# Patient Record
Sex: MALE | Race: WHITE | Employment: OTHER | URBAN - METROPOLITAN AREA
[De-identification: names, ages, dates, MRNs, and addresses within clinical notes are randomized per-mention and may not be internally consistent; named-entity substitution may affect disease eponyms.]

---

## 2022-02-25 ENCOUNTER — HOSPITAL ENCOUNTER (OUTPATIENT)
Age: 47
Discharge: HOME | End: 2022-02-27
Payer: MEDICAID

## 2022-02-25 ENCOUNTER — HOSPITAL ENCOUNTER (OUTPATIENT)
Dept: ULTRASOUND IMAGING | Age: 47
Discharge: HOME | End: 2022-02-27
Payer: MEDICAID

## 2022-02-25 DIAGNOSIS — Z13.6 SCREENING FOR ABDOMINAL AORTIC ANEURYSM: ICD-10-CM

## 2022-02-25 PROCEDURE — 76775 US EXAM ABDO BACK WALL LIM: CPT

## 2025-01-17 ENCOUNTER — HOSPITAL ENCOUNTER (INPATIENT)
Age: 50
LOS: 5 days | Discharge: ANOTHER HOSPITAL | DRG: 304 | End: 2025-01-23
Attending: EMERGENCY MEDICINE | Admitting: FAMILY MEDICINE
Payer: COMMERCIAL

## 2025-01-17 DIAGNOSIS — I16.0 HYPERTENSIVE URGENCY: ICD-10-CM

## 2025-01-17 DIAGNOSIS — R07.9 CHEST PAIN, UNSPECIFIED TYPE: Primary | ICD-10-CM

## 2025-01-17 PROCEDURE — 80053 COMPREHEN METABOLIC PANEL: CPT

## 2025-01-17 PROCEDURE — 6360000002 HC RX W HCPCS: Performed by: EMERGENCY MEDICINE

## 2025-01-17 PROCEDURE — 99285 EMERGENCY DEPT VISIT HI MDM: CPT

## 2025-01-17 PROCEDURE — 96375 TX/PRO/DX INJ NEW DRUG ADDON: CPT

## 2025-01-17 PROCEDURE — 96374 THER/PROPH/DIAG INJ IV PUSH: CPT

## 2025-01-17 PROCEDURE — 85025 COMPLETE CBC W/AUTO DIFF WBC: CPT

## 2025-01-17 PROCEDURE — 93005 ELECTROCARDIOGRAM TRACING: CPT | Performed by: EMERGENCY MEDICINE

## 2025-01-17 PROCEDURE — 84484 ASSAY OF TROPONIN QUANT: CPT

## 2025-01-17 RX ORDER — HYDRALAZINE HYDROCHLORIDE 20 MG/ML
10 INJECTION INTRAMUSCULAR; INTRAVENOUS ONCE
Status: COMPLETED | OUTPATIENT
Start: 2025-01-18 | End: 2025-01-17

## 2025-01-17 RX ORDER — PROCHLORPERAZINE EDISYLATE 5 MG/ML
10 INJECTION INTRAMUSCULAR; INTRAVENOUS ONCE
Status: COMPLETED | OUTPATIENT
Start: 2025-01-18 | End: 2025-01-17

## 2025-01-17 RX ADMIN — PROCHLORPERAZINE EDISYLATE 10 MG: 5 INJECTION INTRAMUSCULAR; INTRAVENOUS at 23:59

## 2025-01-17 RX ADMIN — HYDRALAZINE HYDROCHLORIDE 10 MG: 20 INJECTION INTRAMUSCULAR; INTRAVENOUS at 23:59

## 2025-01-18 ENCOUNTER — APPOINTMENT (OUTPATIENT)
Dept: CT IMAGING | Age: 50
DRG: 304 | End: 2025-01-18
Payer: COMMERCIAL

## 2025-01-18 ENCOUNTER — APPOINTMENT (OUTPATIENT)
Age: 50
DRG: 304 | End: 2025-01-18
Attending: FAMILY MEDICINE
Payer: COMMERCIAL

## 2025-01-18 ENCOUNTER — APPOINTMENT (OUTPATIENT)
Dept: GENERAL RADIOLOGY | Age: 50
DRG: 304 | End: 2025-01-18
Payer: COMMERCIAL

## 2025-01-18 PROBLEM — R33.8 ACUTE URINARY RETENTION: Status: ACTIVE | Noted: 2025-01-18

## 2025-01-18 PROBLEM — E78.00 PURE HYPERCHOLESTEROLEMIA: Status: ACTIVE | Noted: 2025-01-18

## 2025-01-18 PROBLEM — E66.9 MODERATE OBESITY: Status: ACTIVE | Noted: 2025-01-18

## 2025-01-18 PROBLEM — I10 PRIMARY HYPERTENSION: Status: ACTIVE | Noted: 2025-01-18

## 2025-01-18 PROBLEM — E11.9 TYPE 2 DIABETES MELLITUS WITHOUT COMPLICATION, WITHOUT LONG-TERM CURRENT USE OF INSULIN (HCC): Status: ACTIVE | Noted: 2025-01-18

## 2025-01-18 PROBLEM — R07.9 CHEST PAIN: Status: ACTIVE | Noted: 2025-01-18

## 2025-01-18 PROBLEM — R07.89 ATYPICAL CHEST PAIN: Status: ACTIVE | Noted: 2025-01-18

## 2025-01-18 LAB
ALBUMIN SERPL-MCNC: 4.5 G/DL (ref 3.5–5.2)
ALP SERPL-CCNC: 100 U/L (ref 40–129)
ALT SERPL-CCNC: 10 U/L (ref 0–40)
AMPHET UR QL SCN: NEGATIVE
ANION GAP SERPL CALCULATED.3IONS-SCNC: 12 MMOL/L (ref 7–16)
APAP SERPL-MCNC: <5 UG/ML (ref 10–30)
AST SERPL-CCNC: 17 U/L (ref 0–39)
BARBITURATES UR QL SCN: NEGATIVE
BASOPHILS # BLD: 0.1 K/UL (ref 0–0.2)
BASOPHILS NFR BLD: 1 % (ref 0–2)
BENZODIAZ UR QL: NEGATIVE
BILIRUB SERPL-MCNC: 0.5 MG/DL (ref 0–1.2)
BNP SERPL-MCNC: 167 PG/ML (ref 0–125)
BUN SERPL-MCNC: 7 MG/DL (ref 6–20)
BUPRENORPHINE UR QL: NEGATIVE
CALCIUM SERPL-MCNC: 9.4 MG/DL (ref 8.6–10.2)
CANNABINOIDS UR QL SCN: NEGATIVE
CHLORIDE SERPL-SCNC: 100 MMOL/L (ref 98–107)
CHOLEST SERPL-MCNC: 175 MG/DL
CO2 SERPL-SCNC: 26 MMOL/L (ref 22–29)
COCAINE UR QL SCN: NEGATIVE
CREAT SERPL-MCNC: 0.7 MG/DL (ref 0.7–1.2)
ECHO AV CUSP MM: 1.7 CM
ECHO AV MEAN GRADIENT: 8 MMHG
ECHO AV MEAN VELOCITY: 1.4 M/S
ECHO AV PEAK GRADIENT: 14 MMHG
ECHO AV PEAK VELOCITY: 1.9 M/S
ECHO AV VTI: 23.6 CM
ECHO EST RA PRESSURE: 15 MMHG
ECHO LA DIAMETER: 3.9 CM
ECHO LA VOL A-L A2C: 30 ML (ref 18–58)
ECHO LA VOL A-L A4C: 69 ML (ref 18–58)
ECHO LA VOL BP: 48 ML (ref 18–58)
ECHO LA VOL MOD A2C: 29 ML (ref 18–58)
ECHO LA VOL MOD A4C: 66 ML (ref 18–58)
ECHO LA VOLUME AREA LENGTH: 50 ML
ECHO LV EF PHYSICIAN: 80 %
ECHO LV FRACTIONAL SHORTENING: 29 % (ref 28–44)
ECHO LV INTERNAL DIMENSION DIASTOLIC: 4.1 CM (ref 4.2–5.9)
ECHO LV INTERNAL DIMENSION SYSTOLIC: 2.9 CM
ECHO LV IVSD: 1.1 CM (ref 0.6–1)
ECHO LV IVSS: 1.5 CM
ECHO LV MASS 2D: 151.3 G (ref 88–224)
ECHO LV POSTERIOR WALL DIASTOLIC: 1.1 CM (ref 0.6–1)
ECHO LV POSTERIOR WALL SYSTOLIC: 1.3 CM
ECHO LV RELATIVE WALL THICKNESS RATIO: 0.54
ECHO LVOT AREA: 3.1 CM2
ECHO LVOT DIAM: 2 CM
ECHO MV E DECELERATION TIME (DT): 95.8 MS
ECHO MV MAX VELOCITY: 1.9 M/S
ECHO MV MEAN GRADIENT: 8 MMHG
ECHO MV MEAN VELOCITY: 1.3 M/S
ECHO MV PEAK GRADIENT: 14 MMHG
ECHO MV VTI: 21.5 CM
ECHO PV MAX VELOCITY: 2.1 M/S
ECHO PV MEAN GRADIENT: 9 MMHG
ECHO PV MEAN VELOCITY: 1.4 M/S
ECHO PV PEAK GRADIENT: 18 MMHG
ECHO PV VTI: 25 CM
ECHO RIGHT VENTRICULAR SYSTOLIC PRESSURE (RVSP): 59 MMHG
ECHO RV INTERNAL DIMENSION: 2.9 CM
ECHO TV REGURGITANT MAX VELOCITY: 3.33 M/S
ECHO TV REGURGITANT PEAK GRADIENT: 44 MMHG
EOSINOPHIL # BLD: 0.1 K/UL (ref 0.05–0.5)
EOSINOPHILS RELATIVE PERCENT: 1 % (ref 0–6)
ERYTHROCYTE [DISTWIDTH] IN BLOOD BY AUTOMATED COUNT: 13 % (ref 11.5–15)
ETHANOLAMINE SERPL-MCNC: <10 MG/DL (ref 0–0.08)
FENTANYL UR QL: NEGATIVE
GFR, ESTIMATED: >90 ML/MIN/1.73M2
GLUCOSE SERPL-MCNC: 166 MG/DL (ref 74–99)
HBA1C MFR BLD: 5.9 % (ref 4–5.6)
HCT VFR BLD AUTO: 42.9 % (ref 37–54)
HDLC SERPL-MCNC: 54 MG/DL
HGB BLD-MCNC: 14.6 G/DL (ref 12.5–16.5)
IMM GRANULOCYTES # BLD AUTO: 0.05 K/UL (ref 0–0.58)
IMM GRANULOCYTES NFR BLD: 1 % (ref 0–5)
LDLC SERPL CALC-MCNC: 110 MG/DL
LYMPHOCYTES NFR BLD: 1.66 K/UL (ref 1.5–4)
LYMPHOCYTES RELATIVE PERCENT: 15 % (ref 20–42)
MCH RBC QN AUTO: 29 PG (ref 26–35)
MCHC RBC AUTO-ENTMCNC: 34 G/DL (ref 32–34.5)
MCV RBC AUTO: 85.1 FL (ref 80–99.9)
METHADONE UR QL: NEGATIVE
MONOCYTES NFR BLD: 0.51 K/UL (ref 0.1–0.95)
MONOCYTES NFR BLD: 5 % (ref 2–12)
NEUTROPHILS NFR BLD: 78 % (ref 43–80)
NEUTS SEG NFR BLD: 8.38 K/UL (ref 1.8–7.3)
OPIATES UR QL SCN: NEGATIVE
OXYCODONE UR QL SCN: NEGATIVE
PCP UR QL SCN: NEGATIVE
PLATELET # BLD AUTO: 285 K/UL (ref 130–450)
PMV BLD AUTO: 10.3 FL (ref 7–12)
POTASSIUM SERPL-SCNC: 4 MMOL/L (ref 3.5–5)
PROT SERPL-MCNC: 7.8 G/DL (ref 6.4–8.3)
RBC # BLD AUTO: 5.04 M/UL (ref 3.8–5.8)
SALICYLATES SERPL-MCNC: <0.3 MG/DL (ref 0–30)
SODIUM SERPL-SCNC: 138 MMOL/L (ref 132–146)
TEST INFORMATION: NORMAL
TOXIC TRICYCLIC SC,BLOOD: NEGATIVE
TRIGL SERPL-MCNC: 56 MG/DL
TROPONIN I SERPL HS-MCNC: 13 NG/L (ref 0–11)
TROPONIN I SERPL HS-MCNC: 18 NG/L (ref 0–11)
TROPONIN I SERPL HS-MCNC: 9 NG/L (ref 0–11)
TSH SERPL DL<=0.05 MIU/L-ACNC: 1.81 UIU/ML (ref 0.27–4.2)
VLDLC SERPL CALC-MCNC: 11 MG/DL
WBC OTHER # BLD: 10.8 K/UL (ref 4.5–11.5)

## 2025-01-18 PROCEDURE — 83880 ASSAY OF NATRIURETIC PEPTIDE: CPT

## 2025-01-18 PROCEDURE — 71045 X-RAY EXAM CHEST 1 VIEW: CPT

## 2025-01-18 PROCEDURE — 99223 1ST HOSP IP/OBS HIGH 75: CPT | Performed by: INTERNAL MEDICINE

## 2025-01-18 PROCEDURE — 99222 1ST HOSP IP/OBS MODERATE 55: CPT | Performed by: FAMILY MEDICINE

## 2025-01-18 PROCEDURE — 96375 TX/PRO/DX INJ NEW DRUG ADDON: CPT

## 2025-01-18 PROCEDURE — 93306 TTE W/DOPPLER COMPLETE: CPT

## 2025-01-18 PROCEDURE — 83036 HEMOGLOBIN GLYCOSYLATED A1C: CPT

## 2025-01-18 PROCEDURE — 36415 COLL VENOUS BLD VENIPUNCTURE: CPT

## 2025-01-18 PROCEDURE — 80179 DRUG ASSAY SALICYLATE: CPT

## 2025-01-18 PROCEDURE — 6370000000 HC RX 637 (ALT 250 FOR IP)

## 2025-01-18 PROCEDURE — 2500000003 HC RX 250 WO HCPCS: Performed by: FAMILY MEDICINE

## 2025-01-18 PROCEDURE — APPSS60 APP SPLIT SHARED TIME 46-60 MINUTES

## 2025-01-18 PROCEDURE — G0480 DRUG TEST DEF 1-7 CLASSES: HCPCS

## 2025-01-18 PROCEDURE — 6360000002 HC RX W HCPCS

## 2025-01-18 PROCEDURE — 6370000000 HC RX 637 (ALT 250 FOR IP): Performed by: FAMILY MEDICINE

## 2025-01-18 PROCEDURE — 70450 CT HEAD/BRAIN W/O DYE: CPT

## 2025-01-18 PROCEDURE — 84484 ASSAY OF TROPONIN QUANT: CPT

## 2025-01-18 PROCEDURE — 93306 TTE W/DOPPLER COMPLETE: CPT | Performed by: INTERNAL MEDICINE

## 2025-01-18 PROCEDURE — 80307 DRUG TEST PRSMV CHEM ANLYZR: CPT

## 2025-01-18 PROCEDURE — 6360000002 HC RX W HCPCS: Performed by: FAMILY MEDICINE

## 2025-01-18 PROCEDURE — 6360000004 HC RX CONTRAST MEDICATION: Performed by: RADIOLOGY

## 2025-01-18 PROCEDURE — 71275 CT ANGIOGRAPHY CHEST: CPT

## 2025-01-18 PROCEDURE — 84443 ASSAY THYROID STIM HORMONE: CPT

## 2025-01-18 PROCEDURE — 6360000002 HC RX W HCPCS: Performed by: EMERGENCY MEDICINE

## 2025-01-18 PROCEDURE — 80143 DRUG ASSAY ACETAMINOPHEN: CPT

## 2025-01-18 PROCEDURE — 80061 LIPID PANEL: CPT

## 2025-01-18 PROCEDURE — 51702 INSERT TEMP BLADDER CATH: CPT

## 2025-01-18 PROCEDURE — 93005 ELECTROCARDIOGRAM TRACING: CPT

## 2025-01-18 PROCEDURE — 2060000000 HC ICU INTERMEDIATE R&B

## 2025-01-18 RX ORDER — ACETAMINOPHEN 325 MG/1
650 TABLET ORAL EVERY 6 HOURS PRN
Status: DISCONTINUED | OUTPATIENT
Start: 2025-01-18 | End: 2025-01-23 | Stop reason: HOSPADM

## 2025-01-18 RX ORDER — ONDANSETRON 2 MG/ML
4 INJECTION INTRAMUSCULAR; INTRAVENOUS ONCE
Status: COMPLETED | OUTPATIENT
Start: 2025-01-18 | End: 2025-01-18

## 2025-01-18 RX ORDER — ENOXAPARIN SODIUM 100 MG/ML
40 INJECTION SUBCUTANEOUS DAILY
Status: DISCONTINUED | OUTPATIENT
Start: 2025-01-18 | End: 2025-01-21

## 2025-01-18 RX ORDER — ASPIRIN 325 MG
325 TABLET ORAL
Status: ACTIVE | OUTPATIENT
Start: 2025-01-18 | End: 2025-01-18

## 2025-01-18 RX ORDER — ONDANSETRON 2 MG/ML
4 INJECTION INTRAMUSCULAR; INTRAVENOUS EVERY 6 HOURS PRN
Status: DISCONTINUED | OUTPATIENT
Start: 2025-01-18 | End: 2025-01-23 | Stop reason: HOSPADM

## 2025-01-18 RX ORDER — ONDANSETRON 4 MG/1
4 TABLET, ORALLY DISINTEGRATING ORAL EVERY 8 HOURS PRN
Status: DISCONTINUED | OUTPATIENT
Start: 2025-01-18 | End: 2025-01-23 | Stop reason: HOSPADM

## 2025-01-18 RX ORDER — PROCHLORPERAZINE EDISYLATE 5 MG/ML
10 INJECTION INTRAMUSCULAR; INTRAVENOUS ONCE
Status: COMPLETED | OUTPATIENT
Start: 2025-01-18 | End: 2025-01-18

## 2025-01-18 RX ORDER — IOPAMIDOL 755 MG/ML
75 INJECTION, SOLUTION INTRAVASCULAR
Status: COMPLETED | OUTPATIENT
Start: 2025-01-18 | End: 2025-01-18

## 2025-01-18 RX ORDER — POLYETHYLENE GLYCOL 3350 17 G/17G
17 POWDER, FOR SOLUTION ORAL DAILY PRN
Status: DISCONTINUED | OUTPATIENT
Start: 2025-01-18 | End: 2025-01-23 | Stop reason: HOSPADM

## 2025-01-18 RX ORDER — SODIUM CHLORIDE 0.9 % (FLUSH) 0.9 %
5-40 SYRINGE (ML) INJECTION EVERY 12 HOURS SCHEDULED
Status: DISCONTINUED | OUTPATIENT
Start: 2025-01-18 | End: 2025-01-23 | Stop reason: HOSPADM

## 2025-01-18 RX ORDER — CARVEDILOL 25 MG/1
25 TABLET ORAL 2 TIMES DAILY WITH MEALS
Status: DISCONTINUED | OUTPATIENT
Start: 2025-01-18 | End: 2025-01-23 | Stop reason: HOSPADM

## 2025-01-18 RX ORDER — SODIUM CHLORIDE 0.9 % (FLUSH) 0.9 %
5-40 SYRINGE (ML) INJECTION PRN
Status: DISCONTINUED | OUTPATIENT
Start: 2025-01-18 | End: 2025-01-23 | Stop reason: HOSPADM

## 2025-01-18 RX ORDER — POTASSIUM CHLORIDE 1500 MG/1
40 TABLET, EXTENDED RELEASE ORAL PRN
Status: DISCONTINUED | OUTPATIENT
Start: 2025-01-18 | End: 2025-01-23 | Stop reason: HOSPADM

## 2025-01-18 RX ORDER — ACETAMINOPHEN 650 MG/1
650 SUPPOSITORY RECTAL EVERY 6 HOURS PRN
Status: DISCONTINUED | OUTPATIENT
Start: 2025-01-18 | End: 2025-01-23 | Stop reason: HOSPADM

## 2025-01-18 RX ORDER — AMLODIPINE BESYLATE 10 MG/1
10 TABLET ORAL DAILY
Status: DISCONTINUED | OUTPATIENT
Start: 2025-01-18 | End: 2025-01-23 | Stop reason: HOSPADM

## 2025-01-18 RX ORDER — POTASSIUM CHLORIDE 7.45 MG/ML
10 INJECTION INTRAVENOUS PRN
Status: DISCONTINUED | OUTPATIENT
Start: 2025-01-18 | End: 2025-01-23 | Stop reason: HOSPADM

## 2025-01-18 RX ORDER — LABETALOL HYDROCHLORIDE 5 MG/ML
10 INJECTION, SOLUTION INTRAVENOUS ONCE
Status: COMPLETED | OUTPATIENT
Start: 2025-01-18 | End: 2025-01-18

## 2025-01-18 RX ORDER — NITROGLYCERIN 20 MG/100ML
5-200 INJECTION INTRAVENOUS CONTINUOUS
Status: DISCONTINUED | OUTPATIENT
Start: 2025-01-18 | End: 2025-01-19

## 2025-01-18 RX ORDER — ONDANSETRON 2 MG/ML
INJECTION INTRAMUSCULAR; INTRAVENOUS
Status: COMPLETED
Start: 2025-01-18 | End: 2025-01-18

## 2025-01-18 RX ORDER — TAMSULOSIN HYDROCHLORIDE 0.4 MG/1
0.4 CAPSULE ORAL DAILY
Status: DISCONTINUED | OUTPATIENT
Start: 2025-01-18 | End: 2025-01-23 | Stop reason: HOSPADM

## 2025-01-18 RX ORDER — CALCIUM CARBONATE 500 MG/1
500 TABLET, CHEWABLE ORAL 3 TIMES DAILY PRN
Status: DISCONTINUED | OUTPATIENT
Start: 2025-01-18 | End: 2025-01-23 | Stop reason: HOSPADM

## 2025-01-18 RX ORDER — LABETALOL HYDROCHLORIDE 5 MG/ML
INJECTION, SOLUTION INTRAVENOUS
Status: COMPLETED
Start: 2025-01-18 | End: 2025-01-18

## 2025-01-18 RX ORDER — SODIUM CHLORIDE 9 MG/ML
INJECTION, SOLUTION INTRAVENOUS PRN
Status: DISCONTINUED | OUTPATIENT
Start: 2025-01-18 | End: 2025-01-23 | Stop reason: HOSPADM

## 2025-01-18 RX ORDER — HYDRALAZINE HYDROCHLORIDE 20 MG/ML
10 INJECTION INTRAMUSCULAR; INTRAVENOUS EVERY 6 HOURS PRN
Status: DISCONTINUED | OUTPATIENT
Start: 2025-01-18 | End: 2025-01-23 | Stop reason: HOSPADM

## 2025-01-18 RX ORDER — MAGNESIUM SULFATE IN WATER 40 MG/ML
2000 INJECTION, SOLUTION INTRAVENOUS PRN
Status: DISCONTINUED | OUTPATIENT
Start: 2025-01-18 | End: 2025-01-23 | Stop reason: HOSPADM

## 2025-01-18 RX ADMIN — TAMSULOSIN HYDROCHLORIDE 0.4 MG: 0.4 CAPSULE ORAL at 12:49

## 2025-01-18 RX ADMIN — HYDRALAZINE HYDROCHLORIDE 10 MG: 20 INJECTION INTRAMUSCULAR; INTRAVENOUS at 08:02

## 2025-01-18 RX ADMIN — ONDANSETRON 4 MG: 2 INJECTION INTRAMUSCULAR; INTRAVENOUS at 00:57

## 2025-01-18 RX ADMIN — CALCIUM CARBONATE 500 MG: 500 TABLET, CHEWABLE ORAL at 20:28

## 2025-01-18 RX ADMIN — ONDANSETRON 4 MG: 2 INJECTION INTRAMUSCULAR; INTRAVENOUS at 08:03

## 2025-01-18 RX ADMIN — LABETALOL HYDROCHLORIDE 10 MG: 5 INJECTION INTRAVENOUS at 01:44

## 2025-01-18 RX ADMIN — PROCHLORPERAZINE EDISYLATE 10 MG: 5 INJECTION INTRAMUSCULAR; INTRAVENOUS at 09:22

## 2025-01-18 RX ADMIN — LABETALOL HYDROCHLORIDE 10 MG: 5 INJECTION INTRAVENOUS at 03:34

## 2025-01-18 RX ADMIN — LABETALOL HYDROCHLORIDE 10 MG: 5 INJECTION, SOLUTION INTRAVENOUS at 01:44

## 2025-01-18 RX ADMIN — ACETAMINOPHEN 650 MG: 325 TABLET ORAL at 12:49

## 2025-01-18 RX ADMIN — ENOXAPARIN SODIUM 40 MG: 100 INJECTION SUBCUTANEOUS at 12:49

## 2025-01-18 RX ADMIN — ONDANSETRON HYDROCHLORIDE 4 MG: 2 INJECTION, SOLUTION INTRAMUSCULAR; INTRAVENOUS at 00:57

## 2025-01-18 RX ADMIN — CARVEDILOL 25 MG: 25 TABLET, FILM COATED ORAL at 12:49

## 2025-01-18 RX ADMIN — SODIUM CHLORIDE, PRESERVATIVE FREE 10 ML: 5 INJECTION INTRAVENOUS at 19:51

## 2025-01-18 RX ADMIN — AMLODIPINE BESYLATE 10 MG: 10 TABLET ORAL at 12:49

## 2025-01-18 RX ADMIN — NITROGLYCERIN 5 MCG/MIN: 20 INJECTION INTRAVENOUS at 03:58

## 2025-01-18 RX ADMIN — IOPAMIDOL 75 ML: 755 INJECTION, SOLUTION INTRAVENOUS at 00:53

## 2025-01-18 ASSESSMENT — PAIN DESCRIPTION - LOCATION: LOCATION: HEAD

## 2025-01-18 ASSESSMENT — PAIN SCALES - GENERAL
PAINLEVEL_OUTOF10: 1
PAINLEVEL_OUTOF10: 3
PAINLEVEL_OUTOF10: 0

## 2025-01-18 ASSESSMENT — PAIN DESCRIPTION - DESCRIPTORS: DESCRIPTORS: ACHING;POUNDING;THROBBING

## 2025-01-18 NOTE — PROGRESS NOTES
The patient's echocardiogram was reviewed and demonstrates borderline concentric left ventricular hypertrophy with hyperdynamic left ventricular systolic function and no evidence of regional wall motion abnormalities.  Additional high-sensitivity troponin levels are gradually elevated compared to that of baseline and based on symptomatology, albeit atypical and the patient's risk profile, on a prognostic basis, a gated vasodilator myocardial perfusion imaging study will be advisable with continued needs of medical management independent of findings to aggressively treat blood pressure, diabetes and serum lipids in attempt to reduce risk of future atherosclerotic development

## 2025-01-18 NOTE — CONSULTS
Inpatient Cardiology Consultation      Reason for Consult: Chest pain    Consulting Physician: Dr Sanchez    Requesting Physician:  Robi Marte MD    Date of Consultation: 1/18/2025    HISTORY OF PRESENT ILLNESS:   Patient is a 49-year-old male who is previously unknown to [facility] cardiology.    HPI:  Patient presented to ER 1/17/2025 for chest pain beginning 1 hour prior to arrival.  Reportedly worsened by nothing.  Reporting pain rating to arm/neck with dizziness and lightheadedness and nausea and vomiting.  Patient was given nitro and aspirin PTA by EMS.  EKG with no ST changes.  Patient is significant hypertensive and given several rounds of IV antihypertensives hydralazine and labetalol.  Patient with urinary retention in ER unable to void and had to be straight cathed for 1 L urine.  Urology has been consulted.  Patient was started on nitroglycerin drip, Coreg and Norvasc by primary service.  Echo and stress both ordered by primary service as well.  VS upon arrival 98.5, 12 respirations, 93 pulse, 196/118, 94% room air.  Labs: Potassium 4.0, BUN 7, creatinine 0.7, glucose 166, calcium 9.4, troponin 9, albumin 4.5, WBC 10.8, H&H 14.6/42.9, platelet 285  CT head: No acute process  CTA chest: No PE pleural effusion or infiltrate  CXR: No acute process.    Upon assessment today 1/18/2025 patient is lying Semi-Martines in hospital bed on room air with 2 corrections officers at bedside and left arm shackled to bed.  Patient currently incarcerated in longterm since March 2024.  Patient tells me yesterday while lying in his bunk he developed acute onset chest pain described as stabbing in his left chest with radiation to his left arm 10 out of 10.  He reports after the chest pain started he had vomiting and diaphoresis.  He reports this persisted until coming into the ER.  He denies any recent illness or having any new symptoms prior to this chest pain.  He is unsure what medications he is given at the longterm.  you have any questions or concerns.    Jesse Sanchez MD  [facility] Cardiology

## 2025-01-18 NOTE — PROGRESS NOTES
Hospitalist Progress Note      Synopsis:   49 y.o. male who presented to [facility] with chest pain and was found to have significant elevated blood pressure of 233/132.  Patient received couple of IV labetalol and hydralazine.  Initial plan was to start him on nicardipine drip but blood pressure improved to 166/122.  Patient was complaining of chest pain mid chest region on his left side radiating to his left arm.  He does mention to have a history of high blood pressure and takes 2 medications but does not recall what medications he is given in jail.  CT angiogram of the chest was negative for PE.  Troponin of 9.  Patient had urinary retention and required straight cath x 2 in the ER.  Bladder scan with about 400 cc post straight cath x2.  Given persistent chest pain he was started on nitroglycerin drip.     Hospital day 0     Subjective:  Stable overnight.  No issues reported.   Patient seen and examined  Records reviewed.       Temp (24hrs), Av.4 °F (36.9 °C), Min:98.3 °F (36.8 °C), Max:98.5 °F (36.9 °C)    DIET: Diet NPO Exceptions are: Sips of Water with Meds  CODE: Full Code  No intake or output data in the 24 hours ending 25 0850    Review of Systems:    All bolded are positive; please see HPI  General:  Fever, chills, diaphoresis, fatigue, malaise, night sweats, weight loss  Psychological:  Anxiety, disorientation, hallucinations.  ENT:  Epistaxis, headaches, vertigo, visual changes.  Cardiovascular:  Chest pain, irregular heartbeats, palpitations, paroxysmal nocturnal dyspnea.  Respiratory:  Shortness of breath, coughing, sputum production, hemoptysis, wheezing, orthopnea.  Gastrointestinal:  Nausea, vomiting, diarrhea, heartburn, constipation, abdominal pain, hematemesis, hematochezia, melena, acholic stools  Genito-Urinary:  Dysuria, urgency, frequency, hematuria  Musculoskeletal:  Joint pain, joint stiffness, joint swelling, muscle pain  Neurology:  Headache, focal  hours.    No results for input(s): \"CKTOTAL\", \"TROPONINI\" in the last 72 hours.    Chronic labs:    No results found for: \"CHOL\", \"TRIG\", \"HDL\", \"TSH\", \"PSA\", \"INR\", \"LABA1C\"    Radiology: REVIEWED DAILY    Assessment:    Plan:    Chest pain possibly secondary to hypertensive urgency vs CAD  Cardiology consulted, believe chest pain is secondary to suboptimal controlled hypertension.   echocardiogram was reviewed and demonstrates borderline concentric left ventricular hypertrophy with hyperdynamic left ventricular systolic function and no evidence of regional wall motion abnormalities   Troponin 9 > 13 > 18  Stress test ordered  Blood pressure improving will continue Coreg, Norvasc  Continue nitro drip for chest pain  Lipid panel  TSH  Hemoglobin A1c      DVT Prophylaxis [x] Lovenox  []  Heparin [] DOAC [] PCDs [] Ambulation    GI Prophylaxis [] PPI  [] H2 Blocker   [] Carafate  [x] Diet/Tube Feeds   Level of care [] Med/Surg  [x] Intermediate  []  ICU   Diet Diet NPO Exceptions are: Sips of Water with Meds    Family contact []  N/A    [] At bedside  [] Phone call   Disposition Patient requires continued admission    MDM [] Low    [x] Moderate  []   High       Discharge Plan: Currently awaiting stress test and echocardiogram results.     +++++++++++++++++++++++++++++++++++++++++++++++++  JOSEPH Dobson Physician - Hospitalist  [place], [place]  +++++++++++++++++++++++++++++++++++++++++++++++++  NOTE: This report was transcribed using voice recognition software. Every effort was made to ensure accuracy; however, inadvertent computerized transcription errors may be present.

## 2025-01-18 NOTE — ED NOTES
Patient again attempting to void, unable to. Straight cath'd again and drained 600cc urine.  Tolerated well.

## 2025-01-18 NOTE — ED PROVIDER NOTES
HPI:  1/17/25, Time: 11:38 PM EST         Tremayne Han is a 49 y.o. male history of hypertension history of hyperlipidemia presenting to the ED for chest pain, beginning 1 hour ago.  The complaint has been intermittent, moderate in severity, and worsened by nothing.  Patient presenting here because of having chest pain.  Patient reporting pain going to his arm.  Patient reporting dizzy and lightheadedness.  Patient reporting nausea vomiting.  Patient reporting no headache he reports some mild left-sided neck pain.  Patient reporting no calf pain no swelling reports no upper or lower extremity weakness he reports no photophobia.  Patient reporting no fever or productive cough.  Patient was given nitro and aspirin by EMS.    ROS:   Pertinent positives and negatives are stated within HPI, all other systems reviewed and are negative.  --------------------------------------------- PAST HISTORY ---------------------------------------------  Past Medical History:  has no past medical history on file.    Past Surgical History:  has no past surgical history on file.    Social History:  reports that he does not currently use alcohol.    Family History: family history is not on file.     The patient’s home medications have been reviewed.    Allergies: Patient has no known allergies.    ---------------------------------------------------PHYSICAL EXAM--------------------------------------    Constitutional/General: Alert and oriented x3, mild distress  Head: Normocephalic and atraumatic  Eyes: PERRL, EOMI  Mouth: Oropharynx clear, handling secretions, no trismus  Neck: Supple, full ROM, non tender to palpation in the midline, no stridor, no crepitus, no meningeal signs  Pulmonary: Lungs clear to auscultation bilaterally, no wheezes, rales, or rhonchi. Not in respiratory distress  Cardiovascular:  Regular rate. Regular rhythm. No murmurs, gallops, or rubs. 2+ distal pulses  Chest: no chest wall tenderness  Abdomen: Soft.  Non

## 2025-01-18 NOTE — CONSULTS
1/18/2025 10:10 AM  Treamyne Han  [id number]     Chief Complaint:    Urinary retention    History of Present Illness:      The patient is a 49 y.o. male patient who presented to the hospital with complaints of chest pain.  He is a prisoner.  He required straight cath x 2 in the ER and had a Jacobo catheter placed on the floor.  He currently seems lethargic.  Cardiology is following for chest pain rule out.  He does not appear to have any prior urologic history per chart review.      History reviewed. No pertinent past medical history.      History reviewed. No pertinent surgical history.    Medications Prior to Admission:    No medications prior to admission.    Allergies:    Patient has no known allergies.    Social History:    reports that he does not currently use alcohol.    Family History:   Non-contributory to this Urological problem  family history is not on file.    Review of Systems:  A 12 point ROS was reviewed, with pertinent positives and negatives reviewed as above in HPI.    Physical Exam:     Vitals:  BP (!) 172/103   Pulse (!) 110   Temp 98.3 °F (36.8 °C) (Temporal)   Resp 18   Ht 1.829 m (6')   SpO2 94%     General: Awake but not alert or oriented.  No acute distress  HEENT:  Normocephalic, atraumatic.  Lungs:  Respirations symmetric and non-labored.  Abdomen:  soft, nontender, nondistended  Extremities:  No clubbing, cyanosis, or edema  Skin:  Warm and dry, no open lesions or rashes  Neuro: Moving all extremities spontaneously  Genitourinary: Jacobo in place draining clear yellow urine    Labs:     Recent Labs     01/17/25  2355   WBC 10.8   RBC 5.04   HGB 14.6   HCT 42.9   MCV 85.1   MCH 29.0   MCHC 34.0   RDW 13.0      MPV 10.3         Recent Labs     01/17/25  2355   CREATININE 0.7       No results found for: \"PSA\"    Imaging:   No relevant urologic imaging for review at this time    Assessment/plan:  Urinary retention    Discussed with patient urinary tension is likely  multifactorial related to hospitalization, immobility, pain.  Reasonable to attempt trial of void at hospital discharge after he undergoes his cardiology workup.  Urology will follow along      Electronically signed by Master Davis MD on 1/18/2025 at 10:10 AM  [facility] Urology

## 2025-01-18 NOTE — ED NOTES
Radiology Procedure Waiver   Name: Tremayne Han  : 1975  MRN: [id number]    Date:  25    Time: 11:46 PM EST    Benefits of immediately proceeding with Radiology exam(s) without pre-testing outweigh the risks or are not indicated as specified below and therefore the following is/are being waived:    [] Pregnancy test   [] Patients LMP on-time and regular.   [] Patient had Tubal Ligation or has other Contraception Device.   [] Patient  is Menopausal or Premenarcheal.    [] Patient had Full or Partial Hysterectomy.    [] Protocol for Iodine allergy    [] MRI Questionnaire     [x] BUN/Creatinine   [] Patient age w/no hx of renal dysfunction.   [] Patient on Dialysis.   [] Recent Normal Labs.  Electronically signed by Adam Spicer MD on 25 at 11:46 PM Adam Arevalo MD  [phone]

## 2025-01-18 NOTE — PROGRESS NOTES
4 Eyes Skin Assessment     NAME:  Tremayne Han  YOB: 1975  MEDICAL RECORD NUMBER:  [id number]    The patient is being assessed for  Admission    I agree that at least one RN has performed a thorough Head to Toe Skin Assessment on the patient. ALL assessment sites listed below have been assessed.      Areas assessed by both nurses:    Head, Face, Ears, Shoulders, Back, Chest, Arms, Elbows, Hands, Sacrum. Buttock, Coccyx, Ischium, Legs. Feet and Heels, and Under Medical Devices         Does the Patient have a Wound? No noted wound(s)       Markos Prevention initiated by RN: No  Wound Care Orders initiated by RN: No    Pressure Injury (Stage 3,4, Unstageable, DTI, NWPT, and Complex wounds) if present, place Wound referral order by RN under : No    New Ostomies, if present place, Ostomy referral order under : No     Nurse 1 eSignature: Electronically signed by Dorothea Mcfadden RN on 1/18/25 at 6:35 PM EST    **SHARE this note so that the co-signing nurse can place an eSignature**    Nurse 2 eSignature: Electronically signed by Renetta Ryan RN on 1/18/25 at 6:56 PM EST

## 2025-01-18 NOTE — H&P
Hospital Medicine History & Physical      PCP: Roverto Santiago MD    Date of Admission: 1/17/2025    Date of Service: Pt seen/examined on 1/18/24 and Admitted to Inpatient with expected LOS greater than two midnights due to medical therapy.     Chief Complaint:  chest pain      History Of Present Illness:     49 y.o. male who presented to [facility] with chest pain and was found to have significant elevated blood pressure of 233/132.  Patient received couple of IV labetalol and hydralazine.  Initial plan was to start him on nicardipine drip but blood pressure improved to 166/122.  Patient was complaining of chest pain mid chest region on his left side radiating to his left arm.  He does mention to have a history of high blood pressure and takes 2 medications but does not recall what medications he is given in detention.  CT angiogram of the chest was negative for PE.  Troponin of 9.  Patient had urinary retention and required straight cath x 2 in the ER.  Bladder scan with about 400 cc post straight cath x2.  Given persistent chest pain he was started on nitroglycerin drip.    Past Medical History:      History reviewed. No pertinent past medical history.    Past Surgical History:      History reviewed. No pertinent surgical history.    Medications Prior to Admission:      Prior to Admission medications    Not on File       Allergies:  Patient has no known allergies.    Social History:      The patient currently lives detention    TOBACCO:   has no history on file for tobacco use.  ETOH:   reports that he does not currently use alcohol.      Family History:       Reviewed in detail and negative for DM, CAD, Cancer, CVA. Positive as follows:    History reviewed. No pertinent family history.    REVIEW OF SYSTEMS:   Pertinent positives as noted in the HPI. All other systems reviewed and negative.    PHYSICAL EXAM:    BP (!) 209/121   Pulse 94   Temp 98.5 °F (36.9 °C)   Resp 22   Ht 1.829 m (6')   SpO2  was elevated plan was to start him on nicardipine drip but blood pressure did improve.  Started on nitroglycerin drip for chest pain.  Check TSH.  A1c and lipid panel.  Echocardiogram.  Jacobo ordered for urine retention as patient required straight cath x 2 in the ER.  Flomax ordered.  Urology consulted.           Robi Marte MD    Thank you Roverto Santiago MD for the opportunity to be involved in this patient's care. If you have any questions or concerns please feel free to contact me through [employer].

## 2025-01-19 ENCOUNTER — APPOINTMENT (OUTPATIENT)
Dept: CT IMAGING | Age: 50
DRG: 304 | End: 2025-01-19
Payer: COMMERCIAL

## 2025-01-19 ENCOUNTER — APPOINTMENT (OUTPATIENT)
Dept: NUCLEAR MEDICINE | Age: 50
DRG: 304 | End: 2025-01-19
Attending: FAMILY MEDICINE
Payer: COMMERCIAL

## 2025-01-19 ENCOUNTER — APPOINTMENT (OUTPATIENT)
Age: 50
DRG: 304 | End: 2025-01-19
Attending: FAMILY MEDICINE
Payer: COMMERCIAL

## 2025-01-19 PROBLEM — I63.9 ACUTE CVA (CEREBROVASCULAR ACCIDENT) (HCC): Status: ACTIVE | Noted: 2025-01-19

## 2025-01-19 PROBLEM — G81.94 ACUTE LEFT HEMIPARESIS (HCC): Status: ACTIVE | Noted: 2025-01-19

## 2025-01-19 PROBLEM — I16.0 HYPERTENSIVE URGENCY: Status: ACTIVE | Noted: 2025-01-19

## 2025-01-19 LAB
ANION GAP SERPL CALCULATED.3IONS-SCNC: 15 MMOL/L (ref 7–16)
BASOPHILS # BLD: 0.03 K/UL (ref 0–0.2)
BASOPHILS NFR BLD: 0 % (ref 0–2)
BUN SERPL-MCNC: 25 MG/DL (ref 6–20)
CALCIUM SERPL-MCNC: 9.2 MG/DL (ref 8.6–10.2)
CHLORIDE SERPL-SCNC: 98 MMOL/L (ref 98–107)
CO2 SERPL-SCNC: 26 MMOL/L (ref 22–29)
CREAT SERPL-MCNC: 1.3 MG/DL (ref 0.7–1.2)
ECHO BSA: 2.48 M2
EOSINOPHIL # BLD: 0 K/UL (ref 0.05–0.5)
EOSINOPHILS RELATIVE PERCENT: 0 % (ref 0–6)
ERYTHROCYTE [DISTWIDTH] IN BLOOD BY AUTOMATED COUNT: 13.1 % (ref 11.5–15)
GFR, ESTIMATED: 65 ML/MIN/1.73M2
GLUCOSE BLD-MCNC: 158 MG/DL (ref 74–99)
GLUCOSE SERPL-MCNC: 171 MG/DL (ref 74–99)
HCT VFR BLD AUTO: 41.3 % (ref 37–54)
HGB BLD-MCNC: 13.9 G/DL (ref 12.5–16.5)
IMM GRANULOCYTES # BLD AUTO: 0.05 K/UL (ref 0–0.58)
IMM GRANULOCYTES NFR BLD: 0 % (ref 0–5)
LYMPHOCYTES NFR BLD: 1.39 K/UL (ref 1.5–4)
LYMPHOCYTES RELATIVE PERCENT: 11 % (ref 20–42)
MCH RBC QN AUTO: 29.1 PG (ref 26–35)
MCHC RBC AUTO-ENTMCNC: 33.7 G/DL (ref 32–34.5)
MCV RBC AUTO: 86.4 FL (ref 80–99.9)
MONOCYTES NFR BLD: 0.91 K/UL (ref 0.1–0.95)
MONOCYTES NFR BLD: 7 % (ref 2–12)
NEUTROPHILS NFR BLD: 82 % (ref 43–80)
NEUTS SEG NFR BLD: 10.72 K/UL (ref 1.8–7.3)
PLATELET # BLD AUTO: 305 K/UL (ref 130–450)
PMV BLD AUTO: 10.9 FL (ref 7–12)
POTASSIUM SERPL-SCNC: 3.7 MMOL/L (ref 3.5–5)
RBC # BLD AUTO: 4.78 M/UL (ref 3.8–5.8)
SODIUM SERPL-SCNC: 139 MMOL/L (ref 132–146)
STRESS BASELINE HR: 106 BPM
STRESS ESTIMATED WORKLOAD: 1 METS
STRESS PEAK DIAS BP: 86 MMHG
STRESS PEAK SYS BP: 158 MMHG
STRESS PERCENT HR ACHIEVED: 72 %
STRESS POST PEAK HR: 122 BPM
STRESS RATE PRESSURE PRODUCT: NORMAL BPM*MMHG
STRESS TARGET HR: 169 BPM
WBC OTHER # BLD: 13.1 K/UL (ref 4.5–11.5)

## 2025-01-19 PROCEDURE — 6370000000 HC RX 637 (ALT 250 FOR IP)

## 2025-01-19 PROCEDURE — 78452 HT MUSCLE IMAGE SPECT MULT: CPT

## 2025-01-19 PROCEDURE — 2500000003 HC RX 250 WO HCPCS: Performed by: FAMILY MEDICINE

## 2025-01-19 PROCEDURE — 2000000000 HC ICU R&B

## 2025-01-19 PROCEDURE — 93016 CV STRESS TEST SUPVJ ONLY: CPT | Performed by: INTERNAL MEDICINE

## 2025-01-19 PROCEDURE — 3430000000 HC RX DIAGNOSTIC RADIOPHARMACEUTICAL: Performed by: RADIOLOGY

## 2025-01-19 PROCEDURE — 99223 1ST HOSP IP/OBS HIGH 75: CPT | Performed by: PSYCHIATRY & NEUROLOGY

## 2025-01-19 PROCEDURE — 70496 CT ANGIOGRAPHY HEAD: CPT

## 2025-01-19 PROCEDURE — 82962 GLUCOSE BLOOD TEST: CPT

## 2025-01-19 PROCEDURE — 70498 CT ANGIOGRAPHY NECK: CPT

## 2025-01-19 PROCEDURE — 99233 SBSQ HOSP IP/OBS HIGH 50: CPT | Performed by: INTERNAL MEDICINE

## 2025-01-19 PROCEDURE — 70450 CT HEAD/BRAIN W/O DYE: CPT

## 2025-01-19 PROCEDURE — 85025 COMPLETE CBC W/AUTO DIFF WBC: CPT

## 2025-01-19 PROCEDURE — 51702 INSERT TEMP BLADDER CATH: CPT

## 2025-01-19 PROCEDURE — 93017 CV STRESS TEST TRACING ONLY: CPT

## 2025-01-19 PROCEDURE — 80048 BASIC METABOLIC PNL TOTAL CA: CPT

## 2025-01-19 PROCEDURE — 2500000003 HC RX 250 WO HCPCS: Performed by: PSYCHIATRY & NEUROLOGY

## 2025-01-19 PROCEDURE — 6360000002 HC RX W HCPCS

## 2025-01-19 PROCEDURE — A9500 TC99M SESTAMIBI: HCPCS | Performed by: RADIOLOGY

## 2025-01-19 PROCEDURE — 3E03317 INTRODUCTION OF OTHER THROMBOLYTIC INTO PERIPHERAL VEIN, PERCUTANEOUS APPROACH: ICD-10-PCS | Performed by: FAMILY MEDICINE

## 2025-01-19 PROCEDURE — 78452 HT MUSCLE IMAGE SPECT MULT: CPT | Performed by: INTERNAL MEDICINE

## 2025-01-19 PROCEDURE — 2580000003 HC RX 258: Performed by: STUDENT IN AN ORGANIZED HEALTH CARE EDUCATION/TRAINING PROGRAM

## 2025-01-19 PROCEDURE — 36415 COLL VENOUS BLD VENIPUNCTURE: CPT

## 2025-01-19 PROCEDURE — 93018 CV STRESS TEST I&R ONLY: CPT | Performed by: INTERNAL MEDICINE

## 2025-01-19 PROCEDURE — 6360000002 HC RX W HCPCS: Performed by: FAMILY MEDICINE

## 2025-01-19 PROCEDURE — 6360000004 HC RX CONTRAST MEDICATION: Performed by: RADIOLOGY

## 2025-01-19 PROCEDURE — 0042T CT BRAIN PERFUSION: CPT

## 2025-01-19 PROCEDURE — 6360000002 HC RX W HCPCS: Performed by: PSYCHIATRY & NEUROLOGY

## 2025-01-19 RX ORDER — LEVETIRACETAM 500 MG/5ML
INJECTION, SOLUTION, CONCENTRATE INTRAVENOUS
Status: DISCONTINUED
Start: 2025-01-19 | End: 2025-01-20

## 2025-01-19 RX ORDER — LABETALOL HYDROCHLORIDE 5 MG/ML
INJECTION, SOLUTION INTRAVENOUS
Status: COMPLETED
Start: 2025-01-19 | End: 2025-01-19

## 2025-01-19 RX ORDER — REGADENOSON 0.08 MG/ML
0.4 INJECTION, SOLUTION INTRAVENOUS
Status: COMPLETED | OUTPATIENT
Start: 2025-01-19 | End: 2025-01-19

## 2025-01-19 RX ORDER — IOPAMIDOL 755 MG/ML
100 INJECTION, SOLUTION INTRAVASCULAR
Status: COMPLETED | OUTPATIENT
Start: 2025-01-19 | End: 2025-01-19

## 2025-01-19 RX ORDER — LORAZEPAM 2 MG/ML
INJECTION INTRAMUSCULAR
Status: COMPLETED
Start: 2025-01-19 | End: 2025-01-19

## 2025-01-19 RX ORDER — SODIUM CHLORIDE 0.9 % (FLUSH) 0.9 %
10 SYRINGE (ML) INJECTION ONCE
Status: COMPLETED | OUTPATIENT
Start: 2025-01-19 | End: 2025-01-19

## 2025-01-19 RX ORDER — TETRAKIS(2-METHOXYISOBUTYLISOCYANIDE)COPPER(I) TETRAFLUOROBORATE 1 MG/ML
12.5 INJECTION, POWDER, LYOPHILIZED, FOR SOLUTION INTRAVENOUS
Status: COMPLETED | OUTPATIENT
Start: 2025-01-19 | End: 2025-01-19

## 2025-01-19 RX ORDER — SODIUM CHLORIDE 9 MG/ML
INJECTION, SOLUTION INTRAVENOUS CONTINUOUS
Status: ACTIVE | OUTPATIENT
Start: 2025-01-19 | End: 2025-01-19

## 2025-01-19 RX ORDER — TETRAKIS(2-METHOXYISOBUTYLISOCYANIDE)COPPER(I) TETRAFLUOROBORATE 1 MG/ML
35 INJECTION, POWDER, LYOPHILIZED, FOR SOLUTION INTRAVENOUS
Status: COMPLETED | OUTPATIENT
Start: 2025-01-19 | End: 2025-01-19

## 2025-01-19 RX ORDER — SODIUM CHLORIDE 9 MG/ML
INJECTION, SOLUTION INTRAVENOUS PRN
Status: DISCONTINUED | OUTPATIENT
Start: 2025-01-19 | End: 2025-01-23 | Stop reason: HOSPADM

## 2025-01-19 RX ORDER — SODIUM CHLORIDE 0.9 % (FLUSH) 0.9 %
5-40 SYRINGE (ML) INJECTION EVERY 12 HOURS SCHEDULED
Status: DISCONTINUED | OUTPATIENT
Start: 2025-01-19 | End: 2025-01-23 | Stop reason: HOSPADM

## 2025-01-19 RX ORDER — LABETALOL HYDROCHLORIDE 5 MG/ML
10 INJECTION, SOLUTION INTRAVENOUS ONCE
Status: COMPLETED | OUTPATIENT
Start: 2025-01-19 | End: 2025-01-19

## 2025-01-19 RX ORDER — LORAZEPAM 2 MG/ML
1 INJECTION INTRAMUSCULAR ONCE
Status: COMPLETED | OUTPATIENT
Start: 2025-01-19 | End: 2025-01-19

## 2025-01-19 RX ORDER — SODIUM CHLORIDE 0.9 % (FLUSH) 0.9 %
5-40 SYRINGE (ML) INJECTION PRN
Status: DISCONTINUED | OUTPATIENT
Start: 2025-01-19 | End: 2025-01-23 | Stop reason: HOSPADM

## 2025-01-19 RX ORDER — LEVETIRACETAM 500 MG/5ML
1500 INJECTION, SOLUTION, CONCENTRATE INTRAVENOUS ONCE
Status: COMPLETED | OUTPATIENT
Start: 2025-01-19 | End: 2025-01-19

## 2025-01-19 RX ADMIN — SODIUM CHLORIDE: 9 INJECTION, SOLUTION INTRAVENOUS at 12:08

## 2025-01-19 RX ADMIN — LEVETIRACETAM 1500 MG: 100 INJECTION INTRAVENOUS at 13:50

## 2025-01-19 RX ADMIN — REGADENOSON 0.4 MG: 0.08 INJECTION, SOLUTION INTRAVENOUS at 10:40

## 2025-01-19 RX ADMIN — SODIUM CHLORIDE, PRESERVATIVE FREE 10 ML: 5 INJECTION INTRAVENOUS at 13:44

## 2025-01-19 RX ADMIN — Medication 25 MG: at 13:44

## 2025-01-19 RX ADMIN — LORAZEPAM 1 MG: 2 INJECTION INTRAMUSCULAR; INTRAVENOUS at 13:30

## 2025-01-19 RX ADMIN — CALCIUM CARBONATE 500 MG: 500 TABLET, CHEWABLE ORAL at 06:38

## 2025-01-19 RX ADMIN — Medication 12.5 MILLICURIE: at 09:18

## 2025-01-19 RX ADMIN — LABETALOL HYDROCHLORIDE 10 MG: 5 INJECTION INTRAVENOUS at 13:23

## 2025-01-19 RX ADMIN — ONDANSETRON 4 MG: 2 INJECTION INTRAMUSCULAR; INTRAVENOUS at 08:33

## 2025-01-19 RX ADMIN — LORAZEPAM 1 MG: 2 INJECTION INTRAMUSCULAR; INTRAVENOUS at 13:23

## 2025-01-19 RX ADMIN — LABETALOL HYDROCHLORIDE 10 MG: 5 INJECTION, SOLUTION INTRAVENOUS at 13:23

## 2025-01-19 RX ADMIN — Medication 35 MILLICURIE: at 11:44

## 2025-01-19 RX ADMIN — SODIUM CHLORIDE, PRESERVATIVE FREE 10 ML: 5 INJECTION INTRAVENOUS at 08:33

## 2025-01-19 RX ADMIN — IOPAMIDOL 100 ML: 755 INJECTION, SOLUTION INTRAVENOUS at 12:46

## 2025-01-19 RX ADMIN — LABETALOL HYDROCHLORIDE 10 MG: 5 INJECTION INTRAVENOUS at 12:24

## 2025-01-19 RX ADMIN — LORAZEPAM 1 MG: 2 INJECTION INTRAMUSCULAR at 13:23

## 2025-01-19 RX ADMIN — HYDRALAZINE HYDROCHLORIDE 10 MG: 20 INJECTION INTRAMUSCULAR; INTRAVENOUS at 08:32

## 2025-01-19 RX ADMIN — SODIUM CHLORIDE, PRESERVATIVE FREE 10 ML: 5 INJECTION INTRAVENOUS at 20:34

## 2025-01-19 ASSESSMENT — PAIN SCALES - GENERAL: PAINLEVEL_OUTOF10: 0

## 2025-01-19 NOTE — PROGRESS NOTES
The patient's perfusion imaging study was reviewed and demonstrates no evidence of perfusion abnormalities with normal left ventricular systolic function suggesting a low risk of cardiac events.  No additional cardiovascular assessment is indicated with further management deferred to primary care and we will further evaluate him should additional cardiovascular difficulties or concerns arise.   Never

## 2025-01-19 NOTE — PLAN OF CARE
Problem: Discharge Planning  Goal: Discharge to home or other facility with appropriate resources  1/18/2025 2149 by Soha Norwood RN  Outcome: Progressing  1/18/2025 1851 by Dorothea Mcfadden RN  Outcome: Progressing     Problem: Chronic Conditions and Co-morbidities  Goal: Patient's chronic conditions and co-morbidity symptoms are monitored and maintained or improved  1/18/2025 2149 by Soha Norwood RN  Outcome: Progressing  1/18/2025 1851 by Dorothea Mcfadden RN  Outcome: Progressing     Problem: ABCDS Injury Assessment  Goal: Absence of physical injury  1/18/2025 2149 by Soha Norwood RN  Outcome: Progressing  1/18/2025 1851 by Dorothea Mcfadden RN  Outcome: Progressing     Problem: Safety - Adult  Goal: Free from fall injury  Outcome: Progressing

## 2025-01-19 NOTE — CONSULTS
round and reactive to light;   Visual fields were full on confrontation;   Extraocular movements were intact; no nystagmus;    Intact facial sensation to temp, pinprick, and light touch;   Symmetric facial movements with good lip and eye closure bilaterally;   Hearing was intact;  Belcher was midline;   Normal palatal elevation with midline uvula Sternocleidomastoid  / Trapezius strength of 5/5.    Tongue was midline with no atrophy or fasciculations   Motor Exam: Left hemiparesis of worsening upper extremity compared to lower extremity.  Flexor muscle strength was greater in flexor versus extensor muscles.  Strength was normal in her right side.  Tone was mildly increased left side versus right.  Sensory Exam: Decreased pinprick light touch sensation left side versus right.     Cerebella Exam: Synchronous spasms/tremor activity in left arm and leg with no posturing or other abnormal movements.  Movements well-controlled on the right side.    Gait:  Gait was not tested.      Reflexes:  Normal and symmetric bilaterally; Babinski is negative    Labs:    Recent Results (from the past 48 hour(s))   EKG 12 Lead    Collection Time: 01/17/25 11:38 PM   Result Value Ref Range    Ventricular Rate 96 BPM    Atrial Rate 96 BPM    P-R Interval 222 ms    QRS Duration 82 ms    Q-T Interval 364 ms    QTc Calculation (Bazett) 459 ms    P Axis 36 degrees    R Axis 20 degrees    T Axis 39 degrees   CBC with Auto Differential    Collection Time: 01/17/25 11:55 PM   Result Value Ref Range    WBC 10.8 4.5 - 11.5 k/uL    RBC 5.04 3.80 - 5.80 m/uL    Hemoglobin 14.6 12.5 - 16.5 g/dL    Hematocrit 42.9 37.0 - 54.0 %    MCV 85.1 80.0 - 99.9 fL    MCH 29.0 26.0 - 35.0 pg    MCHC 34.0 32.0 - 34.5 g/dL    RDW 13.0 11.5 - 15.0 %    Platelets 285 130 - 450 k/uL    MPV 10.3 7.0 - 12.0 fL    Neutrophils % 78 43.0 - 80.0 %    Lymphocytes % 15 (L) 20.0 - 42.0 %    Monocytes % 5 2.0 - 12.0 %    Eosinophils % 1 0 - 6 %    Basophils % 1 0.0 - 2.0 %     effect or midline shift.  No abnormal extra-axial fluid collection.  The gray-white differentiation is maintained without evidence of an acute infarct.  There is no evidence of hydrocephalus. ORBITS: The visualized portion of the orbits demonstrate no acute abnormality. SINUSES: The visualized paranasal sinuses and mastoid air cells demonstrate no acute abnormality. SOFT TISSUES/SKULL:  No acute abnormality of the visualized skull or soft tissues.     No acute intracranial abnormality.     XR CHEST PORTABLE    Result Date: 1/18/2025  EXAMINATION: ONE XRAY VIEW OF THE CHEST; CTA OF THE CHEST 1/18/2025 12:39 am; 1/18/2025 12:53 am COMPARISON: None. HISTORY: ORDERING SYSTEM PROVIDED HISTORY: hest pain TECHNOLOGIST PROVIDED HISTORY: Reason for exam:->hest pain; ORDERING SYSTEM PROVIDED HISTORY: Chest pain hypertensive TECHNOLOGIST PROVIDED HISTORY: Reason for exam:->Chest pain hypertensive Additional Contrast?->1 What reading provider will be dictating this exam?->CRC FINDINGS: No acute pulmonary embolus identified.  Evidence of prior granulomatous disease including calcified mediastinal and right hilar lymph nodes and lung nodules.  Also small left mid lateral noncalcified minimal sized nodules are clustered that are probably on this basis.  No acute infiltrate or effusion. Small to moderate hiatal hernia.     No acute pulmonary embolus, pleural effusion, or pulmonary infiltrate.     CTA CHEST W CONTRAST    Result Date: 1/18/2025  EXAMINATION: ONE XRAY VIEW OF THE CHEST; CTA OF THE CHEST 1/18/2025 12:39 am; 1/18/2025 12:53 am COMPARISON: None. HISTORY: ORDERING SYSTEM PROVIDED HISTORY: hest pain TECHNOLOGIST PROVIDED HISTORY: Reason for exam:->hest pain; ORDERING SYSTEM PROVIDED HISTORY: Chest pain hypertensive TECHNOLOGIST PROVIDED HISTORY: Reason for exam:->Chest pain hypertensive Additional Contrast?->1 What reading provider will be dictating this exam?->CRC FINDINGS: No acute pulmonary embolus identified.  Evidence

## 2025-01-19 NOTE — PROGRESS NOTES
[facility] Hospitalist Progress Note    SYNOPSIS: Patient admitted on 2025 for Chest pain    49 y.o. male who presented to [facility] with chest pain and was found to have significant elevated blood pressure of 233/132.  Patient received couple of IV labetalol and hydralazine.  Initial plan was to start him on nicardipine drip but blood pressure improved to 166/122.  Patient was complaining of chest pain mid chest region on his left side radiating to his left arm.  He does mention to have a history of high blood pressure and takes 2 medications but does not recall what medications he is given in assisted.  CT angiogram of the chest was negative for PE.  Troponin of 9.  Patient had urinary retention and required straight cath x 2 in the ER.  Bladder scan with about 400 cc post straight cath x2.  Given persistent chest pain he was started on nitroglycerin drip.    -chest pain resolved;-stress test done and reviewed; neg for ischemia-cardiology signed off     Stroke alert called in the noon-after came back from stress test  Pt complains of left sided weakness and paresthesia and speech difficulty ; NIHSS 11; stat ct head done no acute intracrainal abnormality    Dr Tate recommended TNK and neuro ICU observation for concern of right MCA territory stroke    MRI brain pending  SUBJECTIVE:  Stable overnight. No other overnight issues reported.   Patient seen and examined  Records reviewed.           Temp (24hrs), Av.2 °F (36.8 °C), Min:97.4 °F (36.3 °C), Max:98.7 °F (37.1 °C)    DIET: Diet NPO  CODE: Full Code    Intake/Output Summary (Last 24 hours) at 2025 1619  Last data filed at 2025 0805  Gross per 24 hour   Intake 228.96 ml   Output 300 ml   Net -71.04 ml       Review of Systems  All bolded are positive; please see HPI  General:  Fever, chills, diaphoresis, fatigue, malaise, night sweats, weight loss  Psychological:  Anxiety, disorientation, hallucinations.  ENT:   DAILY    +++++++++++++++++++++++++++++++++++++++++++++++++  Ana Vaughn MD  [facility]""  [facility], [place]  +++++++++++++++++++++++++++++++++++++++++++++++++  NOTE: This report was transcribed using voice recognition software. Every effort was made to ensure accuracy; however, inadvertent computerized transcription errors may be present.

## 2025-01-19 NOTE — PROGRESS NOTES
INPATIENT CARDIOLOGY FOLLOW-UP    Name: Tremayne Han    Age: 49 y.o.    Date of Admission: 1/17/2025 11:43 PM    Date of Service: 1/19/2025    Chief Complaint: Follow-up for chest pain, hypertension, acute urinary retention, moderate obesity    Interim History: The patient relates resolution of his chest discomfort with a residual headache and nausea in the face of continued intravenous nitrate administration.  Additional high-sensitivity troponin levels, while not significantly elevated continue a trend of gradual increase with an echocardiogram demonstrating a normal size left ventricular chamber with borderline concentric left ventricular hypertrophy and normal left ventricular systolic function in the absence of significant valvular abnormalities.  Continued urinary retention is present with ongoing use of a urinary drainage catheter.      Review of Systems:   The remainder of a complete multisystem review including consitutional, central nervous, respiratory, circulatory, gastrointestinal, genitourinary, endocrinologic, hematologic, musculoskeletal and psychiatric are negative.    Problem List:  Patient Active Problem List   Diagnosis    Atypical chest pain    Primary hypertension    Type 2 diabetes mellitus without complication, without long-term current use of insulin (HCC)    Pure hypercholesterolemia    Acute urinary retention    Moderate obesity       Allergies:  No Known Allergies    Current Medications:  Current Facility-Administered Medications   Medication Dose Route Frequency Provider Last Rate Last Admin    nitroGLYCERIN 200 mcg/mL in dextrose 5%  5-200 mcg/min IntraVENous Continuous Adam Spicer MD 3 mL/hr at 01/18/25 2333 10 mcg/min at 01/18/25 2333    hydrALAZINE (APRESOLINE) injection 10 mg  10 mg IntraVENous Q6H PRN Robi Marte MD   10 mg at 01/18/25 0802    carvedilol (COREG) tablet 25 mg  25 mg Oral BID  Robi Marte MD   25 mg at 01/18/25 1249    amLODIPine

## 2025-01-20 ENCOUNTER — APPOINTMENT (OUTPATIENT)
Dept: CT IMAGING | Age: 50
DRG: 304 | End: 2025-01-20
Payer: COMMERCIAL

## 2025-01-20 ENCOUNTER — APPOINTMENT (OUTPATIENT)
Dept: NEUROLOGY | Age: 50
DRG: 304 | End: 2025-01-20
Payer: COMMERCIAL

## 2025-01-20 LAB
ANION GAP SERPL CALCULATED.3IONS-SCNC: 13 MMOL/L (ref 7–16)
BASOPHILS # BLD: 0.06 K/UL (ref 0–0.2)
BASOPHILS NFR BLD: 0 % (ref 0–2)
BUN SERPL-MCNC: 43 MG/DL (ref 6–20)
CA-I BLD-SCNC: 1.17 MMOL/L (ref 1.15–1.33)
CALCIUM SERPL-MCNC: 8.7 MG/DL (ref 8.6–10.2)
CHLORIDE SERPL-SCNC: 99 MMOL/L (ref 98–107)
CHOLEST SERPL-MCNC: 142 MG/DL
CO2 SERPL-SCNC: 25 MMOL/L (ref 22–29)
CREAT SERPL-MCNC: 0.8 MG/DL (ref 0.7–1.2)
EKG ATRIAL RATE: 100 BPM
EKG ATRIAL RATE: 101 BPM
EKG ATRIAL RATE: 96 BPM
EKG P AXIS: 36 DEGREES
EKG P AXIS: 44 DEGREES
EKG P AXIS: 52 DEGREES
EKG P-R INTERVAL: 204 MS
EKG P-R INTERVAL: 216 MS
EKG P-R INTERVAL: 222 MS
EKG Q-T INTERVAL: 344 MS
EKG Q-T INTERVAL: 364 MS
EKG Q-T INTERVAL: 368 MS
EKG QRS DURATION: 82 MS
EKG QRS DURATION: 82 MS
EKG QRS DURATION: 92 MS
EKG QTC CALCULATION (BAZETT): 443 MS
EKG QTC CALCULATION (BAZETT): 459 MS
EKG QTC CALCULATION (BAZETT): 477 MS
EKG R AXIS: 20 DEGREES
EKG R AXIS: 53 DEGREES
EKG R AXIS: 57 DEGREES
EKG T AXIS: 28 DEGREES
EKG T AXIS: 39 DEGREES
EKG T AXIS: 50 DEGREES
EKG VENTRICULAR RATE: 100 BPM
EKG VENTRICULAR RATE: 101 BPM
EKG VENTRICULAR RATE: 96 BPM
EOSINOPHIL # BLD: 0.05 K/UL (ref 0.05–0.5)
EOSINOPHILS RELATIVE PERCENT: 0 % (ref 0–6)
ERYTHROCYTE [DISTWIDTH] IN BLOOD BY AUTOMATED COUNT: 12.9 % (ref 11.5–15)
GFR, ESTIMATED: >90 ML/MIN/1.73M2
GLUCOSE SERPL-MCNC: 130 MG/DL (ref 74–99)
HBA1C MFR BLD: 5.8 % (ref 4–5.6)
HCT VFR BLD AUTO: 36.9 % (ref 37–54)
HDLC SERPL-MCNC: 40 MG/DL
HGB BLD-MCNC: 12.2 G/DL (ref 12.5–16.5)
IMM GRANULOCYTES # BLD AUTO: 0.06 K/UL (ref 0–0.58)
IMM GRANULOCYTES NFR BLD: 0 % (ref 0–5)
LDLC SERPL CALC-MCNC: 74 MG/DL
LYMPHOCYTES NFR BLD: 2.8 K/UL (ref 1.5–4)
LYMPHOCYTES RELATIVE PERCENT: 19 % (ref 20–42)
MCH RBC QN AUTO: 28.9 PG (ref 26–35)
MCHC RBC AUTO-ENTMCNC: 33.1 G/DL (ref 32–34.5)
MCV RBC AUTO: 87.4 FL (ref 80–99.9)
MONOCYTES NFR BLD: 1.38 K/UL (ref 0.1–0.95)
MONOCYTES NFR BLD: 10 % (ref 2–12)
NEUTROPHILS NFR BLD: 70 % (ref 43–80)
NEUTS SEG NFR BLD: 10.12 K/UL (ref 1.8–7.3)
PHOSPHATE SERPL-MCNC: 3 MG/DL (ref 2.5–4.5)
PLATELET # BLD AUTO: 259 K/UL (ref 130–450)
PMV BLD AUTO: 10.7 FL (ref 7–12)
POTASSIUM SERPL-SCNC: 3.6 MMOL/L (ref 3.5–5)
RBC # BLD AUTO: 4.22 M/UL (ref 3.8–5.8)
SODIUM SERPL-SCNC: 137 MMOL/L (ref 132–146)
TRIGL SERPL-MCNC: 139 MG/DL
VLDLC SERPL CALC-MCNC: 28 MG/DL
WBC OTHER # BLD: 14.5 K/UL (ref 4.5–11.5)

## 2025-01-20 PROCEDURE — 92523 SPEECH SOUND LANG COMPREHEN: CPT | Performed by: SPEECH-LANGUAGE PATHOLOGIST

## 2025-01-20 PROCEDURE — 82330 ASSAY OF CALCIUM: CPT

## 2025-01-20 PROCEDURE — 6370000000 HC RX 637 (ALT 250 FOR IP): Performed by: NURSE PRACTITIONER

## 2025-01-20 PROCEDURE — 84100 ASSAY OF PHOSPHORUS: CPT

## 2025-01-20 PROCEDURE — 6360000002 HC RX W HCPCS: Performed by: FAMILY MEDICINE

## 2025-01-20 PROCEDURE — 6370000000 HC RX 637 (ALT 250 FOR IP): Performed by: PHYSICIAN ASSISTANT

## 2025-01-20 PROCEDURE — 97166 OT EVAL MOD COMPLEX 45 MIN: CPT

## 2025-01-20 PROCEDURE — 6360000002 HC RX W HCPCS: Performed by: NURSE PRACTITIONER

## 2025-01-20 PROCEDURE — 83036 HEMOGLOBIN GLYCOSYLATED A1C: CPT

## 2025-01-20 PROCEDURE — 95819 EEG AWAKE AND ASLEEP: CPT

## 2025-01-20 PROCEDURE — 95819 EEG AWAKE AND ASLEEP: CPT | Performed by: PSYCHIATRY & NEUROLOGY

## 2025-01-20 PROCEDURE — 99221 1ST HOSP IP/OBS SF/LOW 40: CPT | Performed by: NURSE PRACTITIONER

## 2025-01-20 PROCEDURE — 6370000000 HC RX 637 (ALT 250 FOR IP): Performed by: FAMILY MEDICINE

## 2025-01-20 PROCEDURE — 2060000000 HC ICU INTERMEDIATE R&B

## 2025-01-20 PROCEDURE — 2580000003 HC RX 258: Performed by: NURSE PRACTITIONER

## 2025-01-20 PROCEDURE — 97162 PT EVAL MOD COMPLEX 30 MIN: CPT

## 2025-01-20 PROCEDURE — 93010 ELECTROCARDIOGRAM REPORT: CPT | Performed by: INTERNAL MEDICINE

## 2025-01-20 PROCEDURE — 97530 THERAPEUTIC ACTIVITIES: CPT

## 2025-01-20 PROCEDURE — 80048 BASIC METABOLIC PNL TOTAL CA: CPT

## 2025-01-20 PROCEDURE — 85025 COMPLETE CBC W/AUTO DIFF WBC: CPT

## 2025-01-20 PROCEDURE — 2500000003 HC RX 250 WO HCPCS: Performed by: PSYCHIATRY & NEUROLOGY

## 2025-01-20 PROCEDURE — 80061 LIPID PANEL: CPT

## 2025-01-20 PROCEDURE — 70450 CT HEAD/BRAIN W/O DYE: CPT

## 2025-01-20 RX ORDER — POLYETHYLENE GLYCOL 3350 17 G/17G
17 POWDER, FOR SOLUTION ORAL DAILY
Status: DISCONTINUED | OUTPATIENT
Start: 2025-01-20 | End: 2025-01-23 | Stop reason: HOSPADM

## 2025-01-20 RX ORDER — PANTOPRAZOLE SODIUM 40 MG/1
40 TABLET, DELAYED RELEASE ORAL
Status: DISCONTINUED | OUTPATIENT
Start: 2025-01-21 | End: 2025-01-23 | Stop reason: HOSPADM

## 2025-01-20 RX ORDER — ATORVASTATIN CALCIUM 40 MG/1
40 TABLET, FILM COATED ORAL NIGHTLY
Status: DISCONTINUED | OUTPATIENT
Start: 2025-01-20 | End: 2025-01-23 | Stop reason: HOSPADM

## 2025-01-20 RX ORDER — LEVETIRACETAM 500 MG/1
750 TABLET ORAL 2 TIMES DAILY
Status: DISCONTINUED | OUTPATIENT
Start: 2025-01-20 | End: 2025-01-22

## 2025-01-20 RX ORDER — SENNOSIDES A AND B 8.6 MG/1
1 TABLET, FILM COATED ORAL NIGHTLY
Status: DISCONTINUED | OUTPATIENT
Start: 2025-01-20 | End: 2025-01-23 | Stop reason: HOSPADM

## 2025-01-20 RX ADMIN — ATORVASTATIN CALCIUM 40 MG: 40 TABLET, FILM COATED ORAL at 20:58

## 2025-01-20 RX ADMIN — PANTOPRAZOLE SODIUM 40 MG: 40 INJECTION, POWDER, FOR SOLUTION INTRAVENOUS at 13:48

## 2025-01-20 RX ADMIN — CARVEDILOL 25 MG: 25 TABLET, FILM COATED ORAL at 08:37

## 2025-01-20 RX ADMIN — TAMSULOSIN HYDROCHLORIDE 0.4 MG: 0.4 CAPSULE ORAL at 08:38

## 2025-01-20 RX ADMIN — SENNOSIDES 8.6 MG: 8.6 TABLET, COATED ORAL at 20:58

## 2025-01-20 RX ADMIN — CARVEDILOL 25 MG: 25 TABLET, FILM COATED ORAL at 17:58

## 2025-01-20 RX ADMIN — ACETAMINOPHEN 650 MG: 325 TABLET ORAL at 01:17

## 2025-01-20 RX ADMIN — SODIUM CHLORIDE, PRESERVATIVE FREE 10 ML: 5 INJECTION INTRAVENOUS at 13:48

## 2025-01-20 RX ADMIN — ONDANSETRON 4 MG: 2 INJECTION INTRAMUSCULAR; INTRAVENOUS at 10:24

## 2025-01-20 RX ADMIN — LEVETIRACETAM 750 MG: 500 TABLET, FILM COATED ORAL at 20:58

## 2025-01-20 ASSESSMENT — PAIN SCALES - GENERAL
PAINLEVEL_OUTOF10: 3
PAINLEVEL_OUTOF10: 0
PAINLEVEL_OUTOF10: 1

## 2025-01-20 ASSESSMENT — PAIN DESCRIPTION - DESCRIPTORS: DESCRIPTORS: TIGHTNESS;PRESSURE;DISCOMFORT

## 2025-01-20 ASSESSMENT — PAIN DESCRIPTION - ORIENTATION: ORIENTATION: MID;RIGHT;LEFT

## 2025-01-20 ASSESSMENT — PAIN DESCRIPTION - LOCATION: LOCATION: HEAD

## 2025-01-20 NOTE — CARE COORDINATION
[facility] they asked for updates to be faxed to them [phone], faxed updates. They do plan to accept patient back even if weaker than normal. 2 guards at bedside. TNK yesterday at 1400.     Case Management Assessment  Initial Evaluation    Date/Time of Evaluation: 1/20/2025 1:04 PM  Assessment Completed by: GURINDER Cardoso    If patient is discharged prior to next notation, then this note serves as note for discharge by case management.    Patient Name: Tremayne Han                   YOB: 1975  Diagnosis: Chest pain [R07.9]  Chest pain, unspecified type [R07.9]                   Date / Time: 1/17/2025 11:43 PM    Patient Admission Status: Inpatient   Readmission Risk (Low < 19, Mod (19-27), High > 27): Readmission Risk Score: 14.8    Current PCP: Roverto Santiago MD  PCP verified by CM? Yes    Chart Reviewed: Yes      History Provided by: Medical Record  Patient Orientation: Person    Patient Cognition: Severely Impaired    Hospitalization in the last 30 days (Readmission):  No    If yes, Readmission Assessment in CM Navigator will be completed.    Advance Directives:      Code Status: Full Code   Patient's Primary Decision Maker is: Patient Declined (Legal Next of Kin Remains as Decision Maker)      Discharge Planning:    Patient lives with: Other (Comment) Type of Home: Other (Comment)  Primary Care Giver: Self  Patient Support Systems include: None   Current Financial resources:    Current community resources:    Current services prior to admission: None            Current DME:              Type of Home Care services:  None    ADLS  Prior functional level: Independent in ADLs/IADLs  Current functional level: Assistance with the following:, Mobility, Shopping, Housework, Cooking    PT AM-PAC:   /24  OT AM-PAC:   /24    Family can provide assistance at DC: No  Would you like Case Management to discuss the discharge plan with any other family

## 2025-01-20 NOTE — PROGRESS NOTES
Neurology ANAT Whitaker aware of inability to complete MRI screening as patient is not oriented x4.

## 2025-01-20 NOTE — PROGRESS NOTES
4 Eyes Skin Assessment     NAME:  Tremayne Han  YOB: 1975  MEDICAL RECORD NUMBER:  [id number]    The patient is being assessed for  Transfer to New Unit    I agree that at least one RN has performed a thorough Head to Toe Skin Assessment on the patient. ALL assessment sites listed below have been assessed.      Areas assessed by both nurses:    Head, Face, Ears, Shoulders, Back, Chest, Arms, Elbows, Hands, Sacrum. Buttock, Coccyx, Ischium, and Legs. Feet and Heels        Does the Patient have a Wound? No noted wound(s)       Markos Prevention initiated by RN: yes  Wound Care Orders initiated by RN: No    Pressure Injury (Stage 3,4, Unstageable, DTI, NWPT, and Complex wounds) if present, place Wound referral order by RN under : No    New Ostomies, if present place, Ostomy referral order under : No     Nurse 1 eSignature: Electronically signed by Jennifer Isabel RN on 1/20/25 at 6:30 PM EST    **SHARE this note so that the co-signing nurse can place an eSignature**    Nurse 2 eSignature: Electronically signed by Gisela Bhatt RN on 1/20/25 at 6:31 PM EST

## 2025-01-20 NOTE — PROGRESS NOTES
SPEECH/LANGUAGE PATHOLOGY  SPEECH/LANGUAGE/COGNITIVE EVALUATION   and PLAN OF CARE      PATIENT NAME:  Tremayne Han  (male)     MRN:  [id number]    :  1975  (49 y.o.)  STATUS:  Inpatient: Room 4523/4523-A    TODAY'S DATE:  2025  ORDER DATE, DESCRIPTION AND REFERRING PROVIDER: 25 0745    SLP eval and treat  Start:  25,   End:  25,   ONE TIME,   Standing Count:  1 Occurrences,   R       Maddison, Fabiola, APRN - CNS  REASON FOR REFERRAL: cognitive eval  EVALUATING THERAPIST: NEELAM Pearce    ADMITTING DIAGNOSIS: Chest pain [R07.9]  Chest pain, unspecified type [R07.9]    VISIT DIAGNOSIS:        SPEECH THERAPY  PLAN OF CARE   The speech therapy  POC is established based on physician order, speech pathology diagnosis and results of clinical assessment     SPEECH PATHOLOGY DIAGNOSIS:    Minimal Dysarthria, Mild Cognitive deficits     Speech Pathology intervention is recommended up to 6 times per week for LOS or when goals are met with emphasis on the following:      Conditions Requiring Skilled Therapeutic Intervention for speech, language and/or cognition    Dysarthria   Cognitive linguistic impairment    Specific Speech Therapy Interventions to Include:   Therapeutic tasks for Cognition  Therapeutic exercises for dysarthria    Specific instructions for next treatment:     To initiate POC    SHORT/LONG TERM GOALS  Pt will improve orientation to spatial and temporal surroundings with use of external memory aides.  Pt will improve problem solving/thought organization during structured and unstructured tasks with 90% accuracy   Pt will improve speech intelligibility and increased articulatory precision via compensatory strategies and oral motor exercises     Patient goals: Patient/family involved in developing goals and treatment plan:   Treatment goals discussed with Patient    The Patient understand(s) the diagnosis, prognosis and plan of care   The patient/family Agreed  with above,     This plan may be re-evaluated and revised as warranted.        Rehabilitation Potential/Prognosis: fair                CLINICAL ASSESSMENT:  MOTOR SPEECH       Oral Peripheral Examination   Slight Left labiobuccal weakness    Parameters of Speech Production  Respiration:  Adequate for speech production  Articulation:  Within functional limits  Resonance:  Within functional limits  Quality:   Within functional limits  Pitch:    Within functional limits  Intensity: Quiet  Fluency:  Intact  Prosody Monotone    Speech Intelligibility      Good given unstructured task and unknown context             RECEPTIVE LANGUAGE    Comprehension of Yes/No Questions:   Within functional limits    Process  Simple Verbal Commands:   Could not test  Process Intermediate Verbal Commands:   Could not test  Process Complex Verbal Commands:     Could not test    Comprehension of Conversation:      Within functional limits      EXPRESSIVE LANGUAGE     Serials: Impaired    Imitation:  Words   Functional   Sentences Impaired    Naming:  (Modality used:  Verbal)  Confrontation Naming  Functional  Functional Description  Functional  Response Naming: Functional    Conversation:      Conversation was within functional limits    COGNITION     Attention/Orientation  Attention: Sustained attention   Orientation:  Oriented to Person, Place, Reason for hospitalization    Memory   Immediate Recall: Repeated 3/3    Delayed Recall:   Recalled 1/3     Long Term Recall:   Recalled Birthdate and Age    Organization/Problem Solving/Reasoning   Verbal Sequencing:   Functional        Verbal Problem solving:   Grossly Functional          CLINICAL OBSERVATIONS NOTED DURING THE EVALUATION  Flat affect                  EDUCATION:   The Speech Language Pathologist (SLP) completed education regarding results of evaluation and that intervention is warranted at this time.  Learner: Patient  Education: Reviewed results and recommendations of this

## 2025-01-20 NOTE — SIGNIFICANT EVENT
Rapid Response Team Note  Date of event: 1/19/2025   Time of event: 1215  Tremayne Han 49 y.o. year old male   YOB: 1975   Admit date:  1/17/2025   Location: [address] [place] [address]-A   Witnessed? : []Yes  [] No  Monitored? : []Yes  [] No  Code status: [] Full  [] DNR-CCA  []DNR-CC  ______________________________________________________________________  Reason for RRT:    [] RR < 8     [] RR > 28   [] SpO2 <90%   [] HR < 40 bpm   [] HR > 130 bpm  [] SBP < 90 mmHg    [] SpO2 <90%   [] LOC   [] Seizures    [] Significant Bleeding Event    [] Other: Strokelike symptoms    Subjective:   A stroke alert was called for the above patient.  The patient is a 49-year-old male who presented from prison a day prior due to complaints of hypertensive emergency.  He was initially started on Cardene drip.  According to the nurse patient had a stress test done this morning and when he came back he started complaining about visual disturbance as well as weakness on his left side.  On examination patient appeared lethargic and slow we denied any complaint.  But was unable to lift his left arm and leg.  He did not have any facial droop at the time of initial evaluation.  NIHSS scale was performed and it was 11 without performing the dysarthria part.  The patient blood pressure was 180/110, rest of the vitals were within normal limit.  CT head and CTA head and neck were ordered.  While patient was being taken to the CT scan, on-call neurologist Dr. Chahal was consulted.  He recommended getting CT perfusion scan as well.  Imaging was negative and patient was taken back to the floors.  After patient was taken back patient started exhibiting facial droop on the left side with asymmetric smile.  There was also a jerking movement on the left leg.  Dr. Chahal was consulted again and he came to the bedside.  After evaluating he recommended the patient to be given TNK.  TNK exclusion criteria was performed  Objective:   Vital signs: Temp:  -/BP: 180/110/RR: -/HR: -  Initial Condition:  Conscious   [] Yes  [] No     Breathing [] Yes  [] No     Pulse  [] Yes  [] No    Airway:   [] Open/ Clear     Intervention: [] None  [] Pooled secretions     [] Suctioned  [] Stridor      [] Intubation    Lungs:   [] Symmetrical chest rise/ CTABL Intervention: [] None  [] Use of accessory muscles    [] NIV (CPAP/BiPAP)  [] Cyanosis      [] Nasal Oxygen/Mask  [] Wheezing       [] ABG             [] CXR  [] Other:     Circulation:   Rhythm:  [] Sinus [] Other:   Intervention: [] None            [] IV Access  [] Peripheral              [] Central            [] EKG            [] Cardioversion            [] Defibrillation     Capillary Refill:  [] > 2 seconds [] < 2 seconds    Neurologic:   [] NIHSS      [] Pupillary Response:     Response to pain:   [] Yes  [] No  Follow commands:  [] Yes  [] No  Facial asymmetry:  [] Yes  [] No  Motor strength:  [] Equal  [] Focal deficit:   Diagnostic Test:  Blood Sugar:  158 mg/dL  EKG:    -  ABG:    No results found for: \"PH\", \"PCO2\", \"PO2\", \"HCO3\", \"O2SAT\"  Medication(s) Given:  []  Narcan (Naloxone)   []  Romazicon (Flumazenil)    []  Breathing Treatment    []  Steroids (Prednisone, Solu-Medrol)  []  Adenosine  [] Cardiac Medicines:     Infusion(s):   [] Normal Saline    Amount:  cc/hr      [] Lactate Ringers      [] Other:     Imaging:   [] CXR:   [] Normal         [] Pneumothorax         [] Pulmonary Edema  [] Infiltrate          [x] CT Head  [x] Normal          [] ICB          [] SAH          [] Other:     Laboratory Tests:   CBC with Differential:    Lab Results   Component Value Date/Time    WBC 13.1 01/19/2025 06:08 AM    RBC 4.78 01/19/2025 06:08 AM    HGB 13.9 01/19/2025 06:08 AM    HCT 41.3 01/19/2025 06:08 AM     01/19/2025 06:08 AM    MCV 86.4 01/19/2025 06:08 AM    MCH 29.1 01/19/2025 06:08 AM    MCHC 33.7 01/19/2025 06:08 AM    RDW 13.1 01/19/2025 06:08 AM    LYMPHOPCT 11 01/19/2025 06:08 AM    MONOPCT 7

## 2025-01-20 NOTE — PROCEDURES
[facility] Neurodiagnostic Report    MRN: [id number]  PATIENT NAME: Tremayne Han  DATE OF REPORT: 2025   DATE OF SERVICE: 2025  PHYSICIAN NAME: El Lehman DO  STUDY ORDERED BY: Madeline Whitaker PA-C       Patient's : 1975  Patient's Age: 49 y.o.  Gender: male    PROCEDURE: Routine EEG with video      Clinical Interpretation:   This abnormal study showed evidence of:    Mild to moderate nonspecific cerebral dysfunction of the left temporal lobe  Mild nonspecific cerebral dysfunction of the right anterior temporal lobe    Structural abnormalities should be considered for the findings above and appropriate imaging obtained if clinically indicated.     No seizures or epileptiform discharges were noted during this study.     __________________________  Electronically signed by: El Lehman DO, 2025 5:13 PM      Patient Clinical Information   Reason for Study: The patient is undergoing evaluation for an episode of transient neurologic symptoms  Patient State: Awake  Primary neurological diagnosis: Spell of uncertain etiology  Primary indication for monitoring: Characterization of spell    Pertinent Medications and Treatments    levETIRAcetam     Sedatives administered: No  Intubated: No  Pharmacological paralytic: No    Reporting Period  Start of Study: , 2025   End of Study:  , 2025       EEG Description  Digital video and scalp EEG monitoring was performed using the standard protocol for this laboratory. Scalp electrodes were applied in the international 10/20 system. Multiple digital montage arrangements were utilized for evaluation. EKG and video were recorded.     Background:      Occipital rhythm (posterior dominant rhythm or PDR): Present  Frequency: 9.5 Hz  Voltage: Medium  Organization: fair   Reactivity to eye opening/closure: Fair    Drowsiness: Present - normal  Sleep: Stage 2 present - normal    Comments: There is moderately

## 2025-01-20 NOTE — ACP (ADVANCE CARE PLANNING)
Advance Care Planning   Healthcare Decision Maker:      Click here to complete Healthcare Decision Makers including selection of the Healthcare Decision Maker Relationship (ie \"Primary\").           Patient is incarcerated. No information to family.

## 2025-01-20 NOTE — PLAN OF CARE
Problem: Discharge Planning  Goal: Discharge to home or other facility with appropriate resources  Outcome: Progressing     Problem: Chronic Conditions and Co-morbidities  Goal: Patient's chronic conditions and co-morbidity symptoms are monitored and maintained or improved  Outcome: Progressing     Problem: ABCDS Injury Assessment  Goal: Absence of physical injury  Outcome: Progressing     Problem: Safety - Adult  Goal: Free from fall injury  Outcome: Progressing  Flowsheets (Taken 1/20/2025 1621)  Free From Fall Injury: Instruct family/caregiver on patient safety     Problem: Neurosensory - Adult  Goal: Achieves stable or improved neurological status  Outcome: Progressing  Flowsheets (Taken 1/20/2025 1608)  Achieves stable or improved neurological status: Assess for and report changes in neurological status     Problem: Neurosensory - Adult  Goal: Absence of seizures  Outcome: Progressing  Flowsheets (Taken 1/20/2025 1608)  Absence of seizures: Monitor for seizure activity.  If seizure occurs, document type and location of movements and any associated apnea     Problem: Neurosensory - Adult  Goal: Achieves maximal functionality and self care  Outcome: Progressing     Problem: Cardiovascular - Adult  Goal: Maintains optimal cardiac output and hemodynamic stability  Outcome: Progressing     Problem: Cardiovascular - Adult  Goal: Absence of cardiac dysrhythmias or at baseline  Outcome: Progressing     Problem: Genitourinary - Adult  Goal: Absence of urinary retention  Outcome: Progressing     Problem: Genitourinary - Adult  Goal: Absence of urinary retention  Outcome: Progressing     Problem: Genitourinary - Adult  Goal: Urinary catheter remains patent  Outcome: Progressing     Problem: Skin/Tissue Integrity  Goal: Absence of new skin breakdown  Description: 1.  Monitor for areas of redness and/or skin breakdown  2.  Assess vascular access sites hourly  3.  Every 4-6 hours minimum:  Change oxygen saturation probe

## 2025-01-20 NOTE — CONSULTS
Neuro Science Intensive Care Unit  Critical Care  Critical Care Consult Note  1/20/2025    Date of Admission: 01/19/2025    CC: Follow up for stroke like symptoms.     HOSPITAL COURSE/OVERNIGHT EVENTS:  48 yo man who is currently in the [facility] developed chest wall pain.  He presented to ED.  He was in the process of a cardiac workup.  He was admitted to a monitored floor.  RRT was called then stroke alert called for vision changes & left sided weakness.  He had just completed a stress test with cardiology.  Stroke work up completed.  No intracranial abnormalities noted. NIHSS  11.   During the RRT he had left arm tremors.  Given Keppra 1500 mg & Ativan 2 mg. Decision made to give TNK at 1400 on 01/19.  Admitted to Neuro ICU.     1/20  No issues overnight.  Had 40-50 ml of bloody emesis.    Did not require any prn antihypertensive agents overnight.      PMH: History reviewed. No pertinent past medical history.    PSH: History reviewed. No pertinent surgical history.    Home Medications:   Prior to Admission medications    Not on File     Allergies: No Known Allergies    Social History:  Social History     Socioeconomic History    Marital status: Single     Spouse name: Not on file    Number of children: Not on file    Years of education: Not on file    Highest education level: Not on file   Occupational History    Not on file   Tobacco Use    Smoking status: Unknown    Smokeless tobacco: Not on file   Substance and Sexual Activity    Alcohol use: Not Currently    Drug use: Not on file    Sexual activity: Not on file   Other Topics Concern    Not on file   Social History Narrative    Not on file     Social Determinants of Health     Financial Resource Strain: Not on file   Food Insecurity: No Food Insecurity (1/18/2025)    Hunger Vital Sign     Worried About Running Out of Food in the Last Year: Never true     Ran Out of Food in the Last Year: Never true   Transportation Needs: No Transportation Needs

## 2025-01-20 NOTE — PROGRESS NOTES
Physical Therapy  Physical Therapy Initial Assessment     Name: Tremayne Han  : 1975  MRN: [id number]      Date of Service: 2025    Evaluating PT:  Edwin Hartman, PT, DPT [id number]    Room #:  4523/4523-A  Diagnosis:  Chest pain [R07.9]  Chest pain, unspecified type [R07.9]  PMHx/PSHx:  History reviewed. No pertinent past medical history.  Procedure/Surgery:   TNK  Precautions:  Falls, L hemiparesis, police officers, shackles, hypotension, malingering   Equipment Needs:  TBD    SUBJECTIVE:    Pt was admitted from custodial.  Pt ambulated without device and was independent PTA.    OBJECTIVE:   Initial Evaluation  Date: 25 Treatment Short Term/ Long Term   Goals   AM-PAC 6 Clicks 15/24     Was pt agreeable to Eval/treatment? Yes     Does pt have pain? HA but no rating given     Bed Mobility  Rolling: NT  Supine to sit: SBA  Sit to supine: SBA  Scooting: SBA  Mod Independent    Transfers Sit to stand: NT  Stand to sit: NT  Stand pivot: NT  Independent   Ambulation   NT  >400 feet Independently   Stair negotiation: ascended and descended NT  >4 steps with 1 rail mod Independent   ROM BUE:  Defer to OT note  BLE:  WFL     Strength BUE:  Defer to OT note  RLE:  4+/5  LLE   0/5  Increase by 1/3 MMT grade   Balance Sitting EOB:  SBA  Dynamic Standing:  NT  Sitting EOB:  Independent  Dynamic Standing:  Independent     Pt is A & O x 4  CAM-ICU: NT  RASS: 0  Sensation:  no reported sensation in L side of face, LUE and LLE  Edema:  none    Vitals:  Heart Rate at rest 109 bpm Heart Rate post session 108 bpm   SpO2 at rest -% SpO2 post session 93%   Blood Pressure at rest 87/59 mmHg Blood Pressure post session 93/58 mmHg     Functional Status Score-Intensive Care Unit (FSS-ICU)   Rolling -/7   Supine to sit transfer 5/7   Unsupported sitting  5/7   Sit to stand transfers -/7   Ambulation -/7   Total  -/35     Therapeutic Exercises:  PROM LLE  x 10 reps    Patient education  Pt educated on safety    Patient  treatment:  Progress activity    Current Treatment Recommendations:     [x] Strengthening to improve independence with functional mobility   [x] ROM to improve independence with functional mobility   [x] Balance Training to improve static/dynamic balance and to reduce fall risk  [x] Endurance Training to improve activity tolerance during functional mobility   [x] Transfer Training to improve safety and independence with all functional transfers   [x] Gait Training to improve gait mechanics, endurance and asses need for appropriate assistive device  [x] Stair Training in preparation for safe discharge home and/or into the community   [x] Positioning to prevent skin breakdown and contractures  [x] Safety and Education Training   [x] Patient/Caregiver Education   [] HEP  [] Other     PT long term treatment goals are located in above grid    Frequency of treatments: 2-5x/week x 1-2 weeks.    Time in  1300  Time out  1325    Total Treatment Time  10 minutes     Evaluation Time includes thorough review of current medical information, gathering information on past medical history/social history and prior level of function, completion of standardized testing/informal observation of tasks, assessment of data and education on plan of care and goals.    CPT codes:  [] Low Complexity PT evaluation 74596  [x] Moderate Complexity PT evaluation 61985  [] High Complexity PT evaluation 07305  [] PT Re-evaluation 90123  [] Gait training 99263 - minutes  [] Manual therapy 49235 - minutes  [x] Therapeutic activities 23958 10 minutes  [] Therapeutic exercises 76114 - minutes  [] Neuromuscular reeducation 34256 - minutes     Edwin Hartman, PT, DPT  [id number]

## 2025-01-20 NOTE — PROGRESS NOTES
90/53 -- -- 100 17 92 % -- --   01/19/25 2100 102/67 -- -- 97 20 92 % -- --   01/19/25 2030 110/67 -- -- (!) 104 18 94 % -- --   01/19/25 2000 (!) 98/54 99.1 °F (37.3 °C) Temporal 98 18 98 % -- --   01/19/25 1930 (!) 92/52 -- -- 96 19 96 % -- --   01/19/25 1900 108/61 -- -- 95 19 95 % -- --   01/19/25 1830 (!) 98/55 -- -- 92 16 92 % -- --   01/19/25 1800 107/65 -- -- 91 24 90 % -- --   01/19/25 1730 119/64 -- -- 97 19 94 % -- --   01/19/25 1700 123/80 -- -- 100 19 92 % -- --   01/19/25 1630 135/81 -- -- 97 20 97 % -- --   01/19/25 1600 (!) 137/95 99.5 °F (37.5 °C) Temporal 99 17 95 % -- --   01/19/25 1530 136/85 -- -- 97 15 95 % -- --   01/19/25 1515 121/81 -- -- 97 22 95 % -- --   01/19/25 1500 122/68 -- -- 100 16 93 % -- --   01/19/25 1445 (!) 89/67 -- -- 95 12 92 % -- --   01/19/25 1430 (!) 98/55 -- -- 94 10 92 % -- --   01/19/25 1415 130/77 -- -- 96 12 94 % -- --   01/19/25 1400 (!) 96/58 -- -- (!) 105 16 95 % -- --   01/19/25 1345 105/68 -- -- 96 13 96 % -- --   01/19/25 1344 (!) 101/59 -- -- 98 17 95 % -- --   01/19/25 1300 134/85 -- -- (!) 101 -- -- -- --   01/19/25 1230 (!) 153/88 -- -- 98 14 96 % -- --   01/19/25 1222 -- -- -- -- 14 -- -- --   01/19/25 1221 (!) 182/89 -- -- (!) 115 -- 98 % -- --   01/19/25 1220 (!) 171/101 -- -- (!) 112 14 94 % -- --   01/19/25 1200 (!) 192/116 -- -- -- -- -- -- --   01/19/25 0915 -- -- -- -- -- -- 1.829 m (6') 121.6 kg (268 lb)   01/19/25 0900 (!) 151/81 -- -- -- -- -- -- --       Body mass index is 36.48 kg/m².   General: no signs of acute distress, cooperative, alert  HEENT: Normocephalic, atraumatic, no lesions or abnormalities noted.    Neck:  supple with full ROM; no masses, nodes; no cervical tenderness on palpation.     Lungs:  Nonlabored, speaking full and complete sentences  CV: RRR      Extremities: no c/c/e          Neurologic Exam     Mental Status:  Patient was alert, responsive, oriented, appropriate, answering questions, but slow follow to commands. Speech  review. Automated exposure control, iterative reconstruction, and/or weight based adjustment of the mA/kV was utilized to reduce the radiation dose to as low as reasonably achievable.; CTA of the neck was performed with the administration of intravenous contrast. Multiplanar reformatted images are provided for review.  MIP images are provided for review. Stenosis of the internal carotid arteries measured using NASCET criteria. Automated exposure control, iterative reconstruction, and/or weight based adjustment of the mA/kV was utilized to reduce the radiation dose to as low as reasonably achievable.; CT of the head was performed without the administration of intravenous contrast. Automated exposure control, iterative reconstruction, and/or weight based adjustment of the mA/kV was utilized to reduce the radiation dose to as low as reasonably achievable. Noncontrast CT of the head with reconstructed 2-D images are also provided for review. COMPARISON: Head CT dated 01/18/2025 HISTORY: ORDERING SYSTEM PROVIDED HISTORY: rule out stroke TECHNOLOGIST PROVIDED HISTORY: Reason for exam:->rule out stroke Has a \"code stroke\" or \"stroke alert\" been called?->Yes What reading provider will be dictating this exam?->CRC FINDINGS: CT HEAD: BRAIN/VENTRICLES:  No acute intracranial hemorrhage or extraaxial fluid collection. Grey-white differentiation is maintained.  No evidence of mass, mass effect or midline shift.  No evidence of hydrocephalus. ORBITS: The visualized portion of the orbits demonstrate no acute abnormality. SINUSES:  The visualized paranasal sinuses and mastoid air cells demonstrate no acute abnormality. SOFT TISSUES/SKULL: No acute abnormality of the visualized skull or soft tissues. CTA NECK: AORTIC ARCH/ARCH VESSELS: No dissection or arterial injury.  No significant stenosis of the brachiocephalic or subclavian arteries. CAROTID ARTERIES: No dissection, arterial injury, or hemodynamically significant stenosis by

## 2025-01-20 NOTE — PROGRESS NOTES
OCCUPATIONAL THERAPY INITIAL EVALUATION    [facility]  [address], [place]       Date:2025                                                               Patient Name: Tremayen Han  MRN: [id number]  : 1975  Room: [address] [place] [address]A    Evaluating OT: Aisha Rincon, OTR/L [id number]    Referring Provider:   Fabiola Ray APRN - CNS      Specific Provider Orders/Date: OT eval and treat (25)        Diagnosis: Chest pain [R07.9]  Chest pain, unspecified type [R07.9]         Reason for admission:  49 y.o. male history of hypertension history of hyperlipidemia presenting to the ED for chest pain, while in the process of cardiac workup, an RRT was called for stroke alert: L sided weakness and vision changes. NIHSS 11         Surgery/Procedures:  TNK           Pertinent Medical History:    History reviewed. No pertinent past medical history.       *Precautions:  Fall Risk, alarms, prisoner, officers, shackles, hypotension    Assessment of current deficits   [x] Functional mobility  [x]ADLs  [x] Strength               []Cognition   [x] Functional transfers   [x] IADLs         [x] Safety Awareness   [x]Endurance   [x] Fine Coordination        [x] ROM     [] Vision/perception   []Sensation    [x]Gross Motor Coordination [x] Balance   [] Delirium                  []Motor Control     [] Communication    OT PLAN OF CARE   OT POC based on physician orders, patient diagnosis and results of clinical assessment.       Frequency/Duration: 1-3 days/wk for 1-2 weeks PRN    Specific OT Treatment to include:   ADL retraining/adapted techniques and AE recommendations to increase functional independence within precautions                    Energy conservation techniques to improve tolerance for selfcare routine   Functional transfer/mobility training/DME recommendations for increased independence, safety and fall prevention

## 2025-01-20 NOTE — PROGRESS NOTES
[facility] Hospitalist Progress Note    SYNOPSIS: Patient admitted on 2025 for Chest pain    49 y.o. male who presented to [facility] with chest pain and was found to have significant elevated blood pressure of 233/132.  Patient received couple of IV labetalol and hydralazine.  Initial plan was to start him on nicardipine drip but blood pressure improved to 166/122.  Patient was complaining of chest pain mid chest region on his left side radiating to his left arm.  He does mention to have a history of high blood pressure and takes 2 medications but does not recall what medications he is given in shelter.  CT angiogram of the chest was negative for PE.  Troponin of 9.  Patient had urinary retention and required straight cath x 2 in the ER.  Bladder scan with about 400 cc post straight cath x2.  Given persistent chest pain he was started on nitroglycerin drip.    -chest pain resolved;-stress test done and reviewed; neg for ischemia-cardiology signed off     Stroke alert called in the noon-after came back from stress test  Pt complains of left sided weakness and paresthesia and speech difficulty ; NIHSS 11; stat ct head done no acute intracrainal abnormality    Dr Tate recommended TNK and neuro ICU observation for concern of right MCA territory stroke          - in Neuro ICU-Had 40-50 ml of bloody emesis ;MRI brain pending  SUBJECTIVE:  Stable overnight. No other overnight issues reported.   Patient seen and examined  Records reviewed.           Temp (24hrs), Av.7 °F (37.1 °C), Min:98.1 °F (36.7 °C), Max:99.5 °F (37.5 °C)    DIET: ADULT DIET; Regular; 4 carb choices (60 gm/meal); Low Fat/Low Chol/High Fiber/2 gm Na; Low Fat (less than or equal to 50 gm/day)  CODE: Full Code    Intake/Output Summary (Last 24 hours) at 2025 1246  Last data filed at 2025 1200  Gross per 24 hour   Intake 1239.62 ml   Output 1775 ml   Net -535.38 ml       Review of Systems  All bolded  are positive; please see HPI  General:  Fever, chills, diaphoresis, fatigue, malaise, night sweats, weight loss  Psychological:  Anxiety, disorientation, hallucinations.  ENT:  Epistaxis, headaches, vertigo, visual changes.  Cardiovascular:  Chest pain, irregular heartbeats, palpitations, paroxysmal nocturnal dyspnea.  Respiratory:  Shortness of breath, coughing, sputum production, hemoptysis, wheezing, orthopnea.  Gastrointestinal:  Nausea, vomiting, diarrhea, heartburn, constipation, abdominal pain, hematemesis, hematochezia, melena, acholic stools  Genito-Urinary:  Dysuria, urgency, frequency, hematuria  Musculoskeletal:  Joint pain, joint stiffness, joint swelling, muscle pain  Neurology:  Headache, focal neurological deficits, weakness, numbness, paresthesia  Derm:  Rashes, ulcers, excoriations, bruising  Extremities:  Decreased ROM, peripheral edema, mottling      OBJECTIVE:    /63   Pulse (!) 104   Temp 98.1 °F (36.7 °C) (Temporal)   Resp 25   Ht 1.829 m (6')   Wt 122 kg (268 lb 15.4 oz)   SpO2 94%   BMI 36.48 kg/m²     General appearance:  awake, alert, and oriented to person, place, time, and purpose; appears stated age and cooperative; no apparent distress no labored breathing  HEENT:  Conjunctivae/corneas clear.   Neck: Supple. No jugular venous distention.   Respiratory: symmetrical; clear to auscultation bilaterally; no wheezes; no rhonchi; no rales  Cardiovascular: rhythm regular; rate controlled; no murmurs  Abdomen: Soft, nontender, nondistended  Extremities:  peripheral pulses present; no peripheral edema; no ulcers  Musculoskeletal: No clubbing, cyanosis, no bilateral lower extremity edema. Brisk capillary refill.   Skin:  No rashes  on visible skin  Neurologic: awake, alert and following commands     ASSESSMENT and PLAN:    Acute CVA  Left UE weakness  Currently in Neuro ICU ; manage per neuro ICU  S/p TNK ; neuro checks  MRI of brain-pending  EEG -[facility]  Neurology on board  Kepra

## 2025-01-21 ENCOUNTER — APPOINTMENT (OUTPATIENT)
Dept: MRI IMAGING | Age: 50
DRG: 304 | End: 2025-01-21
Payer: COMMERCIAL

## 2025-01-21 PROBLEM — R29.90 STROKE-LIKE SYMPTOMS: Status: ACTIVE | Noted: 2025-01-21

## 2025-01-21 LAB
ANION GAP SERPL CALCULATED.3IONS-SCNC: 12 MMOL/L (ref 7–16)
BASOPHILS # BLD: 0.08 K/UL (ref 0–0.2)
BASOPHILS NFR BLD: 1 % (ref 0–2)
BUN SERPL-MCNC: 50 MG/DL (ref 6–20)
CA-I BLD-SCNC: 1.14 MMOL/L (ref 1.15–1.33)
CALCIUM SERPL-MCNC: 8.5 MG/DL (ref 8.6–10.2)
CHLORIDE SERPL-SCNC: 98 MMOL/L (ref 98–107)
CO2 SERPL-SCNC: 26 MMOL/L (ref 22–29)
CREAT SERPL-MCNC: 0.9 MG/DL (ref 0.7–1.2)
EOSINOPHIL # BLD: 0.06 K/UL (ref 0.05–0.5)
EOSINOPHILS RELATIVE PERCENT: 1 % (ref 0–6)
ERYTHROCYTE [DISTWIDTH] IN BLOOD BY AUTOMATED COUNT: 12.6 % (ref 11.5–15)
GFR, ESTIMATED: >90 ML/MIN/1.73M2
GLUCOSE SERPL-MCNC: 133 MG/DL (ref 74–99)
HCT VFR BLD AUTO: 30.3 % (ref 37–54)
HGB BLD-MCNC: 10.1 G/DL (ref 12.5–16.5)
IMM GRANULOCYTES # BLD AUTO: 0.03 K/UL (ref 0–0.58)
IMM GRANULOCYTES NFR BLD: 0 % (ref 0–5)
LYMPHOCYTES NFR BLD: 2.82 K/UL (ref 1.5–4)
LYMPHOCYTES RELATIVE PERCENT: 27 % (ref 20–42)
MAGNESIUM SERPL-MCNC: 1.9 MG/DL (ref 1.6–2.6)
MCH RBC QN AUTO: 29.2 PG (ref 26–35)
MCHC RBC AUTO-ENTMCNC: 33.3 G/DL (ref 32–34.5)
MCV RBC AUTO: 87.6 FL (ref 80–99.9)
MONOCYTES NFR BLD: 0.87 K/UL (ref 0.1–0.95)
MONOCYTES NFR BLD: 8 % (ref 2–12)
NEUTROPHILS NFR BLD: 63 % (ref 43–80)
NEUTS SEG NFR BLD: 6.54 K/UL (ref 1.8–7.3)
PHOSPHATE SERPL-MCNC: 2.4 MG/DL (ref 2.5–4.5)
PLATELET # BLD AUTO: 250 K/UL (ref 130–450)
PMV BLD AUTO: 11.2 FL (ref 7–12)
POTASSIUM SERPL-SCNC: 3.3 MMOL/L (ref 3.5–5)
RBC # BLD AUTO: 3.46 M/UL (ref 3.8–5.8)
SODIUM SERPL-SCNC: 136 MMOL/L (ref 132–146)
WBC OTHER # BLD: 10.4 K/UL (ref 4.5–11.5)

## 2025-01-21 PROCEDURE — 6370000000 HC RX 637 (ALT 250 FOR IP): Performed by: PHYSICIAN ASSISTANT

## 2025-01-21 PROCEDURE — 2500000003 HC RX 250 WO HCPCS: Performed by: PSYCHIATRY & NEUROLOGY

## 2025-01-21 PROCEDURE — 80048 BASIC METABOLIC PNL TOTAL CA: CPT

## 2025-01-21 PROCEDURE — 36415 COLL VENOUS BLD VENIPUNCTURE: CPT

## 2025-01-21 PROCEDURE — 6360000002 HC RX W HCPCS: Performed by: FAMILY MEDICINE

## 2025-01-21 PROCEDURE — 99232 SBSQ HOSP IP/OBS MODERATE 35: CPT | Performed by: STUDENT IN AN ORGANIZED HEALTH CARE EDUCATION/TRAINING PROGRAM

## 2025-01-21 PROCEDURE — 6370000000 HC RX 637 (ALT 250 FOR IP): Performed by: FAMILY MEDICINE

## 2025-01-21 PROCEDURE — 2500000003 HC RX 250 WO HCPCS: Performed by: FAMILY MEDICINE

## 2025-01-21 PROCEDURE — 6370000000 HC RX 637 (ALT 250 FOR IP): Performed by: NURSE PRACTITIONER

## 2025-01-21 PROCEDURE — 84100 ASSAY OF PHOSPHORUS: CPT

## 2025-01-21 PROCEDURE — 51798 US URINE CAPACITY MEASURE: CPT

## 2025-01-21 PROCEDURE — 82330 ASSAY OF CALCIUM: CPT

## 2025-01-21 PROCEDURE — 83735 ASSAY OF MAGNESIUM: CPT

## 2025-01-21 PROCEDURE — 2060000000 HC ICU INTERMEDIATE R&B

## 2025-01-21 PROCEDURE — 95819 EEG AWAKE AND ASLEEP: CPT

## 2025-01-21 PROCEDURE — 70551 MRI BRAIN STEM W/O DYE: CPT

## 2025-01-21 PROCEDURE — 99232 SBSQ HOSP IP/OBS MODERATE 35: CPT | Performed by: PHYSICIAN ASSISTANT

## 2025-01-21 PROCEDURE — 85025 COMPLETE CBC W/AUTO DIFF WBC: CPT

## 2025-01-21 RX ORDER — ASPIRIN 81 MG/1
81 TABLET, CHEWABLE ORAL DAILY
Status: DISCONTINUED | OUTPATIENT
Start: 2025-01-21 | End: 2025-01-23 | Stop reason: HOSPADM

## 2025-01-21 RX ORDER — ENOXAPARIN SODIUM 100 MG/ML
30 INJECTION SUBCUTANEOUS 2 TIMES DAILY
Status: DISCONTINUED | OUTPATIENT
Start: 2025-01-22 | End: 2025-01-23 | Stop reason: HOSPADM

## 2025-01-21 RX ORDER — CLOPIDOGREL BISULFATE 75 MG/1
75 TABLET ORAL DAILY
Status: DISCONTINUED | OUTPATIENT
Start: 2025-01-21 | End: 2025-01-22

## 2025-01-21 RX ADMIN — TAMSULOSIN HYDROCHLORIDE 0.4 MG: 0.4 CAPSULE ORAL at 08:25

## 2025-01-21 RX ADMIN — CARVEDILOL 25 MG: 25 TABLET, FILM COATED ORAL at 16:25

## 2025-01-21 RX ADMIN — CLOPIDOGREL BISULFATE 75 MG: 75 TABLET ORAL at 10:20

## 2025-01-21 RX ADMIN — AMLODIPINE BESYLATE 10 MG: 10 TABLET ORAL at 08:26

## 2025-01-21 RX ADMIN — PANTOPRAZOLE SODIUM 40 MG: 40 TABLET, DELAYED RELEASE ORAL at 16:25

## 2025-01-21 RX ADMIN — PANTOPRAZOLE SODIUM 40 MG: 40 TABLET, DELAYED RELEASE ORAL at 06:09

## 2025-01-21 RX ADMIN — LEVETIRACETAM 750 MG: 500 TABLET, FILM COATED ORAL at 08:25

## 2025-01-21 RX ADMIN — LEVETIRACETAM 750 MG: 500 TABLET, FILM COATED ORAL at 21:16

## 2025-01-21 RX ADMIN — ONDANSETRON 4 MG: 2 INJECTION INTRAMUSCULAR; INTRAVENOUS at 08:39

## 2025-01-21 RX ADMIN — ATORVASTATIN CALCIUM 40 MG: 40 TABLET, FILM COATED ORAL at 21:16

## 2025-01-21 RX ADMIN — SENNOSIDES 8.6 MG: 8.6 TABLET, COATED ORAL at 21:16

## 2025-01-21 RX ADMIN — ENOXAPARIN SODIUM 40 MG: 100 INJECTION SUBCUTANEOUS at 08:25

## 2025-01-21 RX ADMIN — CARVEDILOL 25 MG: 25 TABLET, FILM COATED ORAL at 07:53

## 2025-01-21 RX ADMIN — SODIUM CHLORIDE, PRESERVATIVE FREE 10 ML: 5 INJECTION INTRAVENOUS at 21:16

## 2025-01-21 RX ADMIN — SODIUM CHLORIDE, PRESERVATIVE FREE 10 ML: 5 INJECTION INTRAVENOUS at 08:26

## 2025-01-21 RX ADMIN — SODIUM CHLORIDE, PRESERVATIVE FREE 10 ML: 5 INJECTION INTRAVENOUS at 08:32

## 2025-01-21 RX ADMIN — POTASSIUM CHLORIDE 40 MEQ: 1500 TABLET, EXTENDED RELEASE ORAL at 16:25

## 2025-01-21 RX ADMIN — ASPIRIN 81 MG: 81 TABLET, CHEWABLE ORAL at 10:20

## 2025-01-21 RX ADMIN — POLYETHYLENE GLYCOL 3350 17 G: 17 POWDER, FOR SOLUTION ORAL at 08:26

## 2025-01-21 ASSESSMENT — PAIN SCALES - GENERAL
PAINLEVEL_OUTOF10: 0

## 2025-01-21 NOTE — PROGRESS NOTES
[facility] Hospitalist Progress Note    SYNOPSIS: Patient admitted on 2025 for Chest pain    49 y.o. male who presented to [facility] with chest pain and was found to have significant elevated blood pressure of 233/132.  Patient received couple of IV labetalol and hydralazine.  Initial plan was to start him on nicardipine drip but blood pressure improved to 166/122.  Patient was complaining of chest pain mid chest region on his left side radiating to his left arm.  He does mention to have a history of high blood pressure and takes 2 medications but does not recall what medications he is given in detention.  CT angiogram of the chest was negative for PE.  Troponin of 9.  Patient had urinary retention and required straight cath x 2 in the ER.  Bladder scan with about 400 cc post straight cath x2.  Given persistent chest pain he was started on nitroglycerin drip.    -chest pain resolved;-stress test done and reviewed; neg for ischemia-cardiology signed off     Stroke alert called in the noon-after came back from stress test  Pt complains of left sided weakness and paresthesia and speech difficulty ; NIHSS 11; stat ct head done no acute intracrainal abnormality    Dr Tate recommended TNK and neuro ICU observation for concern of right MCA territory stroke          - transferred out of icu; ct head 24 hours post tnk-No acute intracranial findings. ; aspirin plavix started; pt score is 15  - pt still has left sided weakness ;MRI brain pending; neurology on board  SUBJECTIVE:  Stable overnight. No other overnight issues reported.   Patient seen and examined  Records reviewed.           Temp (24hrs), Av.7 °F (36.5 °C), Min:97.3 °F (36.3 °C), Max:97.9 °F (36.6 °C)    DIET: ADULT DIET; Regular; 4 carb choices (60 gm/meal); Low Fat/Low Chol/High Fiber/2 gm Na; Low Fat (less than or equal to 50 gm/day)  CODE: Full Code    Intake/Output Summary (Last 24 hours) at 2025 6638  Last    CO2 26 25 26   BUN 25* 43* 50*   CREATININE 1.3* 0.8 0.9   CALCIUM 9.2 8.7 8.5*   PHOS  --  3.0 2.4*       No results for input(s): \"ALKPHOS\", \"ALT\", \"AST\", \"BILITOT\", \"AMYLASE\", \"LIPASE\" in the last 72 hours.    Invalid input(s): \"PROT\", \"ALB\"      No results for input(s): \"INR\" in the last 72 hours.    No results for input(s): \"CKTOTAL\", \"TROPONINI\" in the last 72 hours.    Chronic labs:    Lab Results   Component Value Date    CHOL 142 01/20/2025    TRIG 139 01/20/2025    HDL 40 (L) 01/20/2025    TSH 1.81 01/18/2025    LABA1C 5.8 (H) 01/20/2025       Radiology: REVIEWED DAILY    +++++++++++++++++++++++++++++++++++++++++++++++++  Ana Vaughn MD  [facility]  [place], [place]  +++++++++++++++++++++++++++++++++++++++++++++++++  NOTE: This report was transcribed using voice recognition software. Every effort was made to ensure accuracy; however, inadvertent computerized transcription errors may be present.

## 2025-01-21 NOTE — PROGRESS NOTES
NEUROLOGY FOLLOW UP NOTE      Requesting Physician: Ana Vaughn MD    Reason for Consult:  Evaluate for stroke alert.    History of Present Illness:  Tremayne Han is a 49 y.o. male  with h/o HTN and HLD who was admitted to [facility] on 1/17/2025 with presentation of chest pain and elevated blood pressure.  Patient currently incarcerated and while lying in his bunk he developed acute onset of chest pain with pain radiating into his arm associated with lightheaded dizziness.  Some nausea, vomiting, and left neck pain reported but no headaches.  There was no report of any recent falls, head or neck trauma, fever, chills, cough, shortness of breath, abdominal pain, vision change, or alteration in mental status.  Patient was brought to ED for evaluation where presenting blood pressure was 196/118 with pulse of 93.  Patient given nitroglycerin and labetalol in ED and subsequently placed on nitroglycerin drip to control blood pressure.  CT scan of the head done in ED did not show any acute intracranial abnormalities.  Patient did not have any focal neurologic deficits on presentation.  Cardiology service was consulted with report of negative echocardiogram.     RRT was called after patient came back from stress test done this a.m. with note of acute onset of left-sided weakness with decreased sensation and paresthesias as well as mild alteration in speech.  This later progressed to left facial droop as well.  Last known well was at approximately 12:15 PM.  Patient an NIHSS is 11 at the time of RRT.  Stroke alert called and patient underwent emergent CT head, CT perfusion, and CTA of head and neck.  Studies were reviewed with no acute intracranial abnormalities on CT head.  CT perfusion study did not show any perfusion mismatch.  No intracranial or extracranial occlusions or hemodynamically significant stenosis noted on CTA studies.  Imaging studies were reviewed by Dr. Stevenson and case discussed with  calculated.  Indeterminate diastolic function is present.  No intracavitary or outflow tract gradient is identified.   Mitral Valve: The mitral valve is structurally normal with mild mitral regurgitation.     CT HEAD WO CONTRAST    No acute intracranial abnormality.     The patient's records from referring provider and available information in the EHR was reviewed.    I have personally reviewed the following studies: CT head, CTP, CTA of head/neck were reviewed with findings as detailed in HPI above.         Impression:  Acute Left hemisensory and motor deficits concerning for acute ischemic stroke s/p TNK- with then development of seizure like activity of the L side. EEG with cerebral dysfunction in the b/l temporal lobes, however, no epileptiform activity or seizures seen. MRI brain remains pending. He was noted to be significantly hypertensive on admission.     Recommendations:                                            Start DAPT with ASA and Plavix   Continue statin therapy   Continue Keppra for now  MRI brain remains pending   PT/OT/speech   Will follow     Electronically signed by ANAT Petty on 1/21/2025 at 2:23 PM

## 2025-01-21 NOTE — PLAN OF CARE
Problem: Discharge Planning  Goal: Discharge to home or other facility with appropriate resources  1/20/2025 2226 by Marcie Carl RN  Outcome: Progressing  1/20/2025 1833 by Jennifer Isabel RN  Outcome: Progressing     Problem: Chronic Conditions and Co-morbidities  Goal: Patient's chronic conditions and co-morbidity symptoms are monitored and maintained or improved  1/20/2025 2226 by Marcie Carl RN  Outcome: Progressing  1/20/2025 1833 by Jennifer Isabel RN  Outcome: Progressing     Problem: ABCDS Injury Assessment  Goal: Absence of physical injury  1/20/2025 2226 by Marcie Carl RN  Outcome: Progressing  1/20/2025 1833 by Jennifer Isabel RN  Outcome: Progressing     Problem: Safety - Adult  Goal: Free from fall injury  1/20/2025 2226 by Marcie Carl RN  Outcome: Progressing  1/20/2025 1833 by Jennifer Isabel RN  Outcome: Progressing  Flowsheets (Taken 1/20/2025 1621)  Free From Fall Injury: Instruct family/caregiver on patient safety     Problem: Neurosensory - Adult  Goal: Achieves stable or improved neurological status  1/20/2025 2226 by Marcie Carl RN  Outcome: Progressing  1/20/2025 1833 by Jennifer Isabel RN  Outcome: Progressing  Flowsheets (Taken 1/20/2025 1608)  Achieves stable or improved neurological status: Assess for and report changes in neurological status  Goal: Absence of seizures  1/20/2025 2226 by Marcie Carl RN  Outcome: Progressing  1/20/2025 1833 by Jennifer Isabel RN  Outcome: Progressing  Flowsheets (Taken 1/20/2025 1608)  Absence of seizures: Monitor for seizure activity.  If seizure occurs, document type and location of movements and any associated apnea  Goal: Achieves maximal functionality and self care  1/20/2025 2226 by Marcie Carl RN  Outcome: Progressing  1/20/2025 1833 by Jennifer Isabel RN  Outcome: Progressing     Problem: Cardiovascular - Adult  Goal: Maintains optimal cardiac

## 2025-01-21 NOTE — PLAN OF CARE
Problem: Neurosensory - Adult  Goal: Achieves stable or improved neurological status  Outcome: Progressing     Problem: Neurosensory - Adult  Goal: Absence of seizures  Outcome: Progressing     Problem: Neurosensory - Adult  Goal: Achieves maximal functionality and self care  Outcome: Progressing     Problem: Cardiovascular - Adult  Goal: Maintains optimal cardiac output and hemodynamic stability  Outcome: Progressing     Problem: Genitourinary - Adult  Goal: Absence of urinary retention  Outcome: Progressing     Problem: Skin/Tissue Integrity  Goal: Absence of new skin breakdown  Description: 1.  Monitor for areas of redness and/or skin breakdown  2.  Assess vascular access sites hourly  3.  Every 4-6 hours minimum:  Change oxygen saturation probe site  4.  Every 4-6 hours:  If on nasal continuous positive airway pressure, respiratory therapy assess nares and determine need for appliance change or resting period.  Outcome: Progressing

## 2025-01-21 NOTE — CARE COORDINATION
Reviewed chart, pt for MRI brain. PT 15/24 OT 12/24. Spoke with nurse Vela at the [facility]-way, pt was independent there, they are asking for therapy to see pt again prior to returning to them. Hoping that pt is up ambulating better before return. Neurology on board. Current plan is for pt to return to [facility] once medically stable.Ivory Rodriguez, MSW, LSW

## 2025-01-21 NOTE — PROGRESS NOTES
SUBJECTIVE:    Jacobo out this am  pt has voided  Pt denies any previous voiding issues    OBJECTIVE:    /66   Pulse 89   Temp 97.8 °F (36.6 °C) (Temporal)   Resp 17   Ht 1.829 m (6')   Wt 122 kg (268 lb 15.4 oz)   SpO2 95%   BMI 36.48 kg/m²     PHYSICAL EXAMINATION:  Skin: dry, without rashes  Respirations: non-labored, intact  Abdomen: soft, non-tender, non-distended      Lab Results   Component Value Date    WBC 10.4 01/21/2025    HGB 10.1 (L) 01/21/2025    HCT 30.3 (L) 01/21/2025    MCV 87.6 01/21/2025     01/21/2025       Lab Results   Component Value Date    CREATININE 0.9 01/21/2025       No results found for: \"PSA\"    REVIEW OF SYSTEMS:    @[employer] Systems[employer]@    PAST FAMILY HISTORY:  History reviewed. No pertinent family history.  PAST SOCIAL HISTORY:    Social History     Socioeconomic History    Marital status: Single     Spouse name: None    Number of children: None    Years of education: None    Highest education level: None   Tobacco Use    Smoking status: Unknown   Substance and Sexual Activity    Alcohol use: Not Currently     Social Determinants of Health     Food Insecurity: No Food Insecurity (1/18/2025)    Hunger Vital Sign     Worried About Running Out of Food in the Last Year: Never true     Ran Out of Food in the Last Year: Never true   Transportation Needs: No Transportation Needs (1/18/2025)    PRAPARE - Transportation     Lack of Transportation (Medical): No     Lack of Transportation (Non-Medical): No   Housing Stability: Low Risk  (1/18/2025)    Housing Stability Vital Sign     Unable to Pay for Housing in the Last Year: No     Number of Times Moved in the Last Year: 0     Homeless in the Last Year: No       Scheduled Meds:   aspirin  81 mg Oral Daily    clopidogrel  75 mg Oral Daily    [START ON 1/22/2025] enoxaparin  30 mg SubCUTAneous BID    pantoprazole  40 mg Oral BID AC    atorvastatin  40 mg Oral

## 2025-01-21 NOTE — PROGRESS NOTES
DVT Prophylaxis Adjustment Policy (DVT Prophylaxis)     This patient is on DVT Prophylaxis medication that requires a dose adjustment      Date Body Weight IBW  Adjusted BW SCr  CrCl Dialysis status   1/21/2025 122 kg (268 lb 15.4 oz) Ideal body weight: 77.6 kg (171 lb 1.2 oz)  Adjusted ideal body weight: 95.4 kg (210 lb 3.7 oz) Serum creatinine: 0.9 mg/dL 01/21/25 0636  Estimated creatinine clearance: 131 mL/min N/a       Pharmacy has dose-adjusted the DVT Prophylaxis regimen to match   the recommendations from the following table        Ordered Medication:Lovenox 40mg daily    Order Changed/converted to: Lovenox 30mg BID      These changes were made per protocol according to the [facility] Pharmacist   Review for Appropriate Use and Automatic Dose Adjustments of   Subcutaneous Anticoagulants Policy     *Please note this dose may need readjusted if patient's condition changes.    Please contact pharmacy with any questions regarding these changes.    Donato Gusman RPH  1/21/2025  11:20 AM

## 2025-01-22 LAB
ANION GAP SERPL CALCULATED.3IONS-SCNC: 7 MMOL/L (ref 7–16)
BASOPHILS # BLD: 0.05 K/UL (ref 0–0.2)
BASOPHILS NFR BLD: 1 % (ref 0–2)
BUN SERPL-MCNC: 23 MG/DL (ref 6–20)
CA-I BLD-SCNC: 1.13 MMOL/L (ref 1.15–1.33)
CALCIUM SERPL-MCNC: 8.8 MG/DL (ref 8.6–10.2)
CHLORIDE SERPL-SCNC: 101 MMOL/L (ref 98–107)
CO2 SERPL-SCNC: 30 MMOL/L (ref 22–29)
CREAT SERPL-MCNC: 0.7 MG/DL (ref 0.7–1.2)
EKG ATRIAL RATE: 77 BPM
EKG P AXIS: 15 DEGREES
EKG P-R INTERVAL: 196 MS
EKG Q-T INTERVAL: 406 MS
EKG QRS DURATION: 96 MS
EKG QTC CALCULATION (BAZETT): 459 MS
EKG R AXIS: 33 DEGREES
EKG T AXIS: 35 DEGREES
EKG VENTRICULAR RATE: 77 BPM
EOSINOPHIL # BLD: 0.07 K/UL (ref 0.05–0.5)
EOSINOPHILS RELATIVE PERCENT: 1 % (ref 0–6)
ERYTHROCYTE [DISTWIDTH] IN BLOOD BY AUTOMATED COUNT: 12.3 % (ref 11.5–15)
GFR, ESTIMATED: >90 ML/MIN/1.73M2
GLUCOSE SERPL-MCNC: 147 MG/DL (ref 74–99)
HCT VFR BLD AUTO: 27.3 % (ref 37–54)
HGB BLD-MCNC: 9.3 G/DL (ref 12.5–16.5)
IMM GRANULOCYTES # BLD AUTO: 0.04 K/UL (ref 0–0.58)
IMM GRANULOCYTES NFR BLD: 1 % (ref 0–5)
LYMPHOCYTES NFR BLD: 1.61 K/UL (ref 1.5–4)
LYMPHOCYTES RELATIVE PERCENT: 19 % (ref 20–42)
MCH RBC QN AUTO: 29.2 PG (ref 26–35)
MCHC RBC AUTO-ENTMCNC: 34.1 G/DL (ref 32–34.5)
MCV RBC AUTO: 85.6 FL (ref 80–99.9)
MONOCYTES NFR BLD: 0.61 K/UL (ref 0.1–0.95)
MONOCYTES NFR BLD: 7 % (ref 2–12)
NEUTROPHILS NFR BLD: 72 % (ref 43–80)
NEUTS SEG NFR BLD: 6.21 K/UL (ref 1.8–7.3)
PHOSPHATE SERPL-MCNC: 3.2 MG/DL (ref 2.5–4.5)
PLATELET # BLD AUTO: 231 K/UL (ref 130–450)
PMV BLD AUTO: 11.2 FL (ref 7–12)
POTASSIUM SERPL-SCNC: 4 MMOL/L (ref 3.5–5)
RBC # BLD AUTO: 3.19 M/UL (ref 3.8–5.8)
SODIUM SERPL-SCNC: 138 MMOL/L (ref 132–146)
TROPONIN I SERPL HS-MCNC: 10 NG/L (ref 0–11)
TROPONIN I SERPL HS-MCNC: 11 NG/L (ref 0–11)
TROPONIN I SERPL HS-MCNC: 12 NG/L (ref 0–11)
TROPONIN I SERPL HS-MCNC: 12 NG/L (ref 0–11)
TROPONIN I SERPL HS-MCNC: 14 NG/L (ref 0–11)
WBC OTHER # BLD: 8.6 K/UL (ref 4.5–11.5)

## 2025-01-22 PROCEDURE — 97535 SELF CARE MNGMENT TRAINING: CPT

## 2025-01-22 PROCEDURE — 84100 ASSAY OF PHOSPHORUS: CPT

## 2025-01-22 PROCEDURE — 6370000000 HC RX 637 (ALT 250 FOR IP): Performed by: FAMILY MEDICINE

## 2025-01-22 PROCEDURE — 80048 BASIC METABOLIC PNL TOTAL CA: CPT

## 2025-01-22 PROCEDURE — 93010 ELECTROCARDIOGRAM REPORT: CPT | Performed by: INTERNAL MEDICINE

## 2025-01-22 PROCEDURE — 82330 ASSAY OF CALCIUM: CPT

## 2025-01-22 PROCEDURE — 51798 US URINE CAPACITY MEASURE: CPT

## 2025-01-22 PROCEDURE — 2500000003 HC RX 250 WO HCPCS: Performed by: PSYCHIATRY & NEUROLOGY

## 2025-01-22 PROCEDURE — 93005 ELECTROCARDIOGRAM TRACING: CPT | Performed by: INTERNAL MEDICINE

## 2025-01-22 PROCEDURE — 99232 SBSQ HOSP IP/OBS MODERATE 35: CPT | Performed by: PHYSICIAN ASSISTANT

## 2025-01-22 PROCEDURE — 84484 ASSAY OF TROPONIN QUANT: CPT

## 2025-01-22 PROCEDURE — 85025 COMPLETE CBC W/AUTO DIFF WBC: CPT

## 2025-01-22 PROCEDURE — 99233 SBSQ HOSP IP/OBS HIGH 50: CPT | Performed by: INTERNAL MEDICINE

## 2025-01-22 PROCEDURE — 6370000000 HC RX 637 (ALT 250 FOR IP): Performed by: PHYSICIAN ASSISTANT

## 2025-01-22 PROCEDURE — 36415 COLL VENOUS BLD VENIPUNCTURE: CPT

## 2025-01-22 PROCEDURE — 6360000002 HC RX W HCPCS: Performed by: FAMILY MEDICINE

## 2025-01-22 PROCEDURE — 2060000000 HC ICU INTERMEDIATE R&B

## 2025-01-22 PROCEDURE — 97530 THERAPEUTIC ACTIVITIES: CPT

## 2025-01-22 PROCEDURE — 6370000000 HC RX 637 (ALT 250 FOR IP): Performed by: NURSE PRACTITIONER

## 2025-01-22 PROCEDURE — 2500000003 HC RX 250 WO HCPCS: Performed by: FAMILY MEDICINE

## 2025-01-22 PROCEDURE — 2580000003 HC RX 258: Performed by: INTERNAL MEDICINE

## 2025-01-22 RX ORDER — 0.9 % SODIUM CHLORIDE 0.9 %
500 INTRAVENOUS SOLUTION INTRAVENOUS ONCE
Status: COMPLETED | OUTPATIENT
Start: 2025-01-22 | End: 2025-01-22

## 2025-01-22 RX ADMIN — ONDANSETRON 4 MG: 2 INJECTION INTRAMUSCULAR; INTRAVENOUS at 09:39

## 2025-01-22 RX ADMIN — SODIUM CHLORIDE, PRESERVATIVE FREE 10 ML: 5 INJECTION INTRAVENOUS at 22:42

## 2025-01-22 RX ADMIN — SODIUM CHLORIDE, PRESERVATIVE FREE 10 ML: 5 INJECTION INTRAVENOUS at 09:53

## 2025-01-22 RX ADMIN — ASPIRIN 81 MG: 81 TABLET, CHEWABLE ORAL at 09:39

## 2025-01-22 RX ADMIN — SODIUM CHLORIDE, PRESERVATIVE FREE 10 ML: 5 INJECTION INTRAVENOUS at 22:43

## 2025-01-22 RX ADMIN — CLOPIDOGREL BISULFATE 75 MG: 75 TABLET ORAL at 09:39

## 2025-01-22 RX ADMIN — ENOXAPARIN SODIUM 30 MG: 100 INJECTION SUBCUTANEOUS at 21:08

## 2025-01-22 RX ADMIN — LEVETIRACETAM 750 MG: 500 TABLET, FILM COATED ORAL at 09:39

## 2025-01-22 RX ADMIN — PANTOPRAZOLE SODIUM 40 MG: 40 TABLET, DELAYED RELEASE ORAL at 17:47

## 2025-01-22 RX ADMIN — CARVEDILOL 25 MG: 25 TABLET, FILM COATED ORAL at 18:01

## 2025-01-22 RX ADMIN — ATORVASTATIN CALCIUM 40 MG: 40 TABLET, FILM COATED ORAL at 21:08

## 2025-01-22 RX ADMIN — ENOXAPARIN SODIUM 30 MG: 100 INJECTION SUBCUTANEOUS at 09:39

## 2025-01-22 RX ADMIN — ACETAMINOPHEN 650 MG: 325 TABLET ORAL at 17:51

## 2025-01-22 RX ADMIN — ONDANSETRON 4 MG: 2 INJECTION INTRAMUSCULAR; INTRAVENOUS at 17:47

## 2025-01-22 RX ADMIN — PANTOPRAZOLE SODIUM 40 MG: 40 TABLET, DELAYED RELEASE ORAL at 06:19

## 2025-01-22 RX ADMIN — CARVEDILOL 25 MG: 25 TABLET, FILM COATED ORAL at 09:39

## 2025-01-22 RX ADMIN — TAMSULOSIN HYDROCHLORIDE 0.4 MG: 0.4 CAPSULE ORAL at 09:39

## 2025-01-22 RX ADMIN — SODIUM CHLORIDE 500 ML: 9 INJECTION, SOLUTION INTRAVENOUS at 11:26

## 2025-01-22 RX ADMIN — AMLODIPINE BESYLATE 10 MG: 10 TABLET ORAL at 09:39

## 2025-01-22 ASSESSMENT — PAIN DESCRIPTION - DESCRIPTORS
DESCRIPTORS: ACHING
DESCRIPTORS: ACHING

## 2025-01-22 ASSESSMENT — PAIN DESCRIPTION - LOCATION
LOCATION: HEAD;ARM
LOCATION: HEAD

## 2025-01-22 ASSESSMENT — PAIN SCALES - GENERAL
PAINLEVEL_OUTOF10: 0
PAINLEVEL_OUTOF10: 6
PAINLEVEL_OUTOF10: 6

## 2025-01-22 NOTE — PROGRESS NOTES
INPATIENT CARDIOLOGY FOLLOW-UP    Name: Tremayne Han    Age: 49 y.o.    Date of Admission: 1/17/2025 11:43 PM    Date of Service: 1/22/2025    Chief Complaint: Follow-up for noncardiac chest pain, hypertension, acute urinary retention, moderate obesity    Interim History: The patient was evaluated earlier during hospitalization the face of prolonged chest pain in the absence of electrocardiographic abnormalities and with a trend of abnormal high sensitive troponin levels and not indicative of myocardial injury and in the absence of structural cardiac abnormalities with prior assessment as outlined.  He began to note recurrent sharp stabbing left precordial chest discomfort the previous evening with symptoms continuous and him appearing comfortable at the time of observation in spite of reported ongoing discomfort and in no distress.  Interim electrocardiographic tracings at time of evaluation reviewed at time of assessment demonstrated evidence of sinus rhythm and are otherwise unremarkable and a persistent unremarkable high-sensitivity troponin levels documented.      Review of Systems:   The remainder of a complete multisystem review including consitutional, central nervous, respiratory, circulatory, gastrointestinal, genitourinary, endocrinologic, hematologic, musculoskeletal and psychiatric are negative.    Problem List:  Patient Active Problem List   Diagnosis    Chest pain    Primary hypertension    Type 2 diabetes mellitus without complication, without long-term current use of insulin (HCC)    Pure hypercholesterolemia    Acute urinary retention    Moderate obesity    Acute CVA (cerebrovascular accident) (HCC)    Acute left hemiparesis (HCC)    Hypertensive urgency    Stroke-like symptoms       Allergies:  No Known Allergies    Current Medications:  Current Facility-Administered Medications   Medication Dose Route Frequency Provider Last Rate Last Admin    aspirin chewable tablet 81 mg  81 mg Oral Daily

## 2025-01-22 NOTE — PLAN OF CARE
Problem: Discharge Planning  Goal: Discharge to home or other facility with appropriate resources  Outcome: Progressing     Problem: Chronic Conditions and Co-morbidities  Goal: Patient's chronic conditions and co-morbidity symptoms are monitored and maintained or improved  Outcome: Progressing     Problem: ABCDS Injury Assessment  Goal: Absence of physical injury  Outcome: Progressing     Problem: Safety - Adult  Goal: Free from fall injury  Outcome: Progressing     Problem: Neurosensory - Adult  Goal: Achieves stable or improved neurological status  1/21/2025 2244 by Marcie Carl RN  Outcome: Progressing  1/21/2025 1848 by Barby Kline RN  Outcome: Progressing  Goal: Absence of seizures  1/21/2025 2244 by Marcie Carl RN  Outcome: Progressing  1/21/2025 1848 by Barby Kline RN  Outcome: Progressing  Goal: Achieves maximal functionality and self care  1/21/2025 2244 by Marcie Carl RN  Outcome: Progressing  1/21/2025 1848 by Barby Kline RN  Outcome: Progressing     Problem: Cardiovascular - Adult  Goal: Maintains optimal cardiac output and hemodynamic stability  1/21/2025 2244 by Marcie Carl RN  Outcome: Progressing  1/21/2025 1848 by Barby Kline RN  Outcome: Progressing  Goal: Absence of cardiac dysrhythmias or at baseline  Outcome: Progressing     Problem: Genitourinary - Adult  Goal: Absence of urinary retention  1/21/2025 2244 by Marcie Carl RN  Outcome: Progressing  1/21/2025 1848 by Barby Kline RN  Outcome: Progressing  Goal: Urinary catheter remains patent  Outcome: Progressing     Problem: Skin/Tissue Integrity  Goal: Absence of new skin breakdown  Description: 1.  Monitor for areas of redness and/or skin breakdown  2.  Assess vascular access sites hourly  3.  Every 4-6 hours minimum:  Change oxygen saturation probe site  4.  Every 4-6 hours:  If on nasal continuous positive airway pressure, respiratory therapy assess

## 2025-01-22 NOTE — CARE COORDINATION
Reviewed chart, PT 14/24 d/t orthostatic hypotension. Cardiology consulted. Pt from [facility], guards at bedside. Will follow for discharge needs.Ivory Rodriguez, MSW, LSW

## 2025-01-22 NOTE — PROGRESS NOTES
NEUROLOGY FOLLOW UP NOTE      Requesting Physician: Ana Vaughn MD    Reason for Consult:  Evaluate for stroke alert.    History of Present Illness:  Tremayne Han is a 49 y.o. male  with h/o HTN and HLD who was admitted to [facility] on 1/17/2025 with presentation of chest pain and elevated blood pressure.  Patient currently incarcerated and while lying in his bunk he developed acute onset of chest pain with pain radiating into his arm associated with lightheaded dizziness.  Some nausea, vomiting, and left neck pain reported but no headaches.  There was no report of any recent falls, head or neck trauma, fever, chills, cough, shortness of breath, abdominal pain, vision change, or alteration in mental status.  Patient was brought to ED for evaluation where presenting blood pressure was 196/118 with pulse of 93.  Patient given nitroglycerin and labetalol in ED and subsequently placed on nitroglycerin drip to control blood pressure.  CT scan of the head done in ED did not show any acute intracranial abnormalities.  Patient did not have any focal neurologic deficits on presentation.  Cardiology service was consulted with report of negative echocardiogram.     RRT was called after patient came back from stress test done this a.m. with note of acute onset of left-sided weakness with decreased sensation and paresthesias as well as mild alteration in speech.  This later progressed to left facial droop as well.  Last known well was at approximately 12:15 PM.  Patient an NIHSS is 11 at the time of RRT.  Stroke alert called and patient underwent emergent CT head, CT perfusion, and CTA of head and neck.  Studies were reviewed with no acute intracranial abnormalities on CT head.  CT perfusion study did not show any perfusion mismatch.  No intracranial or extracranial occlusions or hemodynamically significant stenosis noted on CTA studies.  Imaging studies were reviewed by Dr. Stevenson and case discussed with

## 2025-01-22 NOTE — PROGRESS NOTES
[facility] Hospitalist Progress Note    SYNOPSIS: Patient admitted on 2025 for Chest pain    49 y.o. male who presented to [facility] with chest pain and was found to have significant elevated blood pressure of 233/132.  Patient received couple of IV labetalol and hydralazine.  Initial plan was to start him on nicardipine drip but blood pressure improved to 166/122.  Patient was complaining of chest pain mid chest region on his left side radiating to his left arm.  He does mention to have a history of high blood pressure and takes 2 medications but does not recall what medications he is given in halfway.  CT angiogram of the chest was negative for PE.  Troponin of 9.  Patient had urinary retention and required straight cath x 2 in the ER.  Bladder scan with about 400 cc post straight cath x2.  Given persistent chest pain he was started on nitroglycerin drip.    -chest pain resolved;-stress test done and reviewed; neg for ischemia-cardiology signed off     Stroke alert called in the noon-after came back from stress test  Pt complains of left sided weakness and paresthesia and speech difficulty ; NIHSS 11; stat ct head done no acute intracrainal abnormality    Dr Tate recommended TNK and neuro ICU observation for concern of right MCA territory stroke          - transferred out of icu; ct head 24 hours post tnk-No acute intracranial findings. ; aspirin plavix started; pt score is 15  - pt still has left sided weakness ;MRI brain pending; neurology on board  SUBJECTIVE:  Seen earlier  Patient was feeling okay  No new complaints          Temp (24hrs), Av °F (36.7 °C), Min:97.8 °F (36.6 °C), Max:98.3 °F (36.8 °C)    DIET: ADULT DIET; Regular; 4 carb choices (60 gm/meal); Low Fat/Low Chol/High Fiber/2 gm Na; Low Fat (less than or equal to 50 gm/day)  CODE: Full Code    Intake/Output Summary (Last 24 hours) at 2025 1641  Last data filed at 2025 1506  Gross per 24

## 2025-01-22 NOTE — PROGRESS NOTES
1/22/2025 2:14 PM  Tremayne Han  [id number]    Subjective: Guards at bedside  Sleeping  States he has not urinated  PVR this morning was 400 mL  He is not uncomfortable  Denies feelings of retention    Review of Systems  Constitutional: No fever or chills   Respiratory: negative for cough and hemoptysis  Cardiovascular: negative for chest pain and dyspnea  Gastrointestinal: negative for abdominal pain, diarrhea, nausea and vomiting   : See above  Derm: negative for rash and skin lesion(s)  Neurological: negative for seizures and tremors  Musculoskeletal: Negative    Psychiatric: Negative   All other reviews are negative      Scheduled Meds:   aspirin  81 mg Oral Daily    clopidogrel  75 mg Oral Daily    enoxaparin  30 mg SubCUTAneous BID    pantoprazole  40 mg Oral BID AC    atorvastatin  40 mg Oral Nightly    senna  1 tablet Oral Nightly    polyethylene glycol  17 g Oral Daily    levETIRAcetam  750 mg Oral BID    sodium chloride flush  5-40 mL IntraVENous 2 times per day    carvedilol  25 mg Oral BID WC    amLODIPine  10 mg Oral Daily    sodium chloride flush  5-40 mL IntraVENous 2 times per day    tamsulosin  0.4 mg Oral Daily       Objective:  Vitals:    01/22/25 1215   BP: 106/60   Pulse:    Resp:    Temp:    SpO2:          Allergies: Patient has no known allergies.    General Appearance: Awake and alert, no distress  Skin: no rash or erythema  Head: normocephalic and atraumatic  Pulmonary/Chest: normal air movement, no respiratory distress  Abdomen: soft, non-tender, non-distended  Genitourinary: No Jacobo  Extremities: no cyanosis, clubbing or edema         Labs:     Recent Labs     01/22/25  0635      K 4.0      CO2 30*   BUN 23*   CREATININE 0.7   GLUCOSE 147*   CALCIUM 8.8       Lab Results   Component Value Date/Time    HGB 9.3 01/22/2025 06:35 AM    HCT 27.3 01/22/2025 06:35 AM       No results found for: \"PSA\"      Assessment/Plan:  Urinary retention    Discussed with nursing, check  another bladder scan at approximately 2:30 PM.  If greater than 500 we will need the Jacobo catheter reinserted and left indwelling as he has not urinated at all since the catheter has been removed  We will follow    JOSEPH Bush - CNP   CRISTAL  Urology

## 2025-01-22 NOTE — PLAN OF CARE
Problem: Discharge Planning  Goal: Discharge to home or other facility with appropriate resources  Outcome: Progressing     Problem: Chronic Conditions and Co-morbidities  Goal: Patient's chronic conditions and co-morbidity symptoms are monitored and maintained or improved  Outcome: Progressing     Problem: Discharge Planning  Goal: Discharge to home or other facility with appropriate resources  Outcome: Progressing     Problem: Safety - Adult  Goal: Free from fall injury  Outcome: Progressing     Problem: Neurosensory - Adult  Goal: Achieves stable or improved neurological status  Outcome: Progressing     Problem: Neurosensory - Adult  Goal: Absence of seizures  Outcome: Progressing

## 2025-01-22 NOTE — PROGRESS NOTES
Call received from Urology, repeat bladder scan @ 1430 if over 500mL residual then ayala to be placed.

## 2025-01-22 NOTE — PROGRESS NOTES
Physical Therapy  Treatment Note    Name: Tremayne Han  : 1975  MRN: [id number]      Date of Service: 2025    Evaluating PT:  Edwin Hartman, PT, DPT [id number]    Room #:  7418/7418-A  Diagnosis:  Chest pain [R07.9]  Chest pain, unspecified type [R07.9]  PMHx/PSHx:  History reviewed. No pertinent past medical history.  Procedure/Surgery:   TNK  Precautions:  Falls, L hemiparesis, police officers, shackles, hypotension, malingering   Equipment Needs:  TBD    SUBJECTIVE:    Pt was admitted from shelter.  Pt ambulated without device and was independent PTA.    OBJECTIVE:   Initial Evaluation  Date: 25 Treatment  Date: 25 Short Term/ Long Term   Goals   AM-PAC 6 Clicks 15/24 14/24    Was pt agreeable to Eval/treatment? Yes yes    Does pt have pain? HA but no rating given 5/10 chest pain    Bed Mobility  Rolling: NT  Supine to sit: SBA  Sit to supine: SBA  Scooting: SBA Rolling: NT  Supine to sit: SBA  Sit to supine: min A  Scooting: SBA Mod Independent    Transfers Sit to stand: NT  Stand to sit: NT  Stand pivot: NT Sit to stand: min A  Stand to sit: min A  Stand pivot: NT Independent   Ambulation   NT 2' with HHA min A (limited by hypotension) >400 feet Independently   Stair negotiation: ascended and descended NT NT >4 steps with 1 rail mod Independent   ROM BUE:  Defer to OT note  BLE:  WFL     Strength BUE:  Defer to OT note  RLE:  4+/5  LLE   0/5  Increase by 1/3 MMT grade   Balance Sitting EOB:  SBA  Dynamic Standing:  NT Sitting EOB:  SBA  Dynamic Standing:  min A with HHA Sitting EOB:  Independent  Dynamic Standing:  Independent     Pt is A & O x 3  Sensation:  Pt denies numbness and tingling to extremities  Edema:  unremarkable    Vitals:  BP in sittin/70  BP in standin/49  BP in supine, end of session: 106/70    Therapeutic Exercises:    Bed mobility: supine<>sit, cued for positioning at EOB  Transfers: STS x3, cued for hand placement and postural correction  Ambulation: 2'  with HHA  BLE AROM    Patient education  Pt educated on safety with functional mobility    Patient response to education:   Pt verbalized understanding Pt demonstrated skill Pt requires further education in this area   yes partial yes     ASSESSMENT:    Comments:  Pt supine in bed upon entering, pt agreeable to participate. Pt instructed to transfer to EOB, completing transfer with no physical assistance. Pt sitting upright with good sitting balance. Pt with c/o dizziness with position change, BP detailed above. Pt was positive for orthostatic hypotension. While in standing, pt became increasingly dizzy and could not tolerate ambulation this date. Pt was assisted back to supine at end of session. Pt positioned for comfort with all needs met and call bell in reach prior to exiting. Nursing present and aware of pt's BP.    Treatment:  Patient practiced and was instructed in the following treatment:    Bed mobility training - pt given verbal and tactile cues to facilitate proper sequencing and safety during rolling and supine>sit as well as provided with stand by assistance to complete task   Sitting EOB for >5 minutes for upright tolerance, postural awareness and BLE ROM  STS and pivot transfer training - pt educated on proper hand and foot placement, safety and sequencing, and use of verbal and tactile cues to safely complete sit<>stand and pivot transfers with physical assistance to complete task safely       PLAN:    Patient is making fair progress towards established goals.  Will continue with current POC.      Time in  1101  Time out  1124    Total Treatment Time  23 minutes     CPT codes:  [] Gait training 28495 -- minutes  [] Manual therapy 52347 -- minutes  [x] Therapeutic activities 53781 23 minutes  [] Therapeutic exercises 88099 -- minutes  [] Neuromuscular reeducation 99870 -- minutes    Babak Duron, PT, DPT  [id number]

## 2025-01-22 NOTE — PROGRESS NOTES
Patient due to void at 6 pm but pt was off floor at MRI. On return to floor, pt asymptomatic, unable to void. Bladder scan showed 547 ml of urine. Oncoming RN notified of possible need to straight cath.

## 2025-01-22 NOTE — PROGRESS NOTES
Call made to [facility] Urology answering service message left regarding pts bladder scan and confirm orders for ayala placement.

## 2025-01-22 NOTE — PROGRESS NOTES
Patient/family education to increase safety and functional independence within precautions              Environmental modifications for safe mobility and completion of ADLs                           Cognitive retraining ex's to improve problem solving skills & safe participation in ADLs/IADLs  Sensory re-education techniques to improve extremity awareness, maintain skin integrity and improve hand function                             Visual/Perceptual retraining  to improve body awareness and safety during transfers and ADLs  Splinting/positioning needs to maintain joint/skin integrity and prevent contractures  Therapeutic activity to improve functional performance during ADLs/IADLs                                         Therapeutic exercise to improve tolerance and functional strength for ADLs   Balance retraining exercises/tasks for facilitation of postural control with dynamic challenges during ADLs .  Positioning to improve functional independence  Neuromuscular re-education: facilitation of righting/equilibrium reactions, normalization muscle tone/facilitation active functional movement                      Delirium prevention/treatment     Modified Fredo Scale   Score     Description  0             No symptoms  1             No significant disability despite symptoms  2             Slight disability; able to look after own affairs  3             Moderate disability; able to ambulate without assist/ requires assist with ADLs  4             Moderate/Severe disability;requires assist to ambulate/assist with ADLs  5             Severe disability;bedridden/incontinent   6               Score:   4     Recommended Adaptive Equipment: TBA     Home Living: Pt admitted from half-way.     Prior Level of Function: IND with ADLs; IND no AD for ambulation.      Pain Level: Pt c/o pain 5/10 in chest. Nursing present and aware. Therapist facilitated repositioning for pain control.     Cognition: A&O: /. Follows 1-2  tasks:    Cognitive retraining - Cues for safety, sequencing, and problem solving    Skilled positioning/alignment-  Proper Positioning/Alignment for pressure relief, joint protection and to increase functional interaction with environment   Skilled monitoring of Vital signs- Sittin/70 Hr 80, Standin/49 , Supine: 106/70 HR 79    Pt has made fair progress towards set goals.   Continue with current plan of care    Treatment Time In:1100            Treatment Time Out: 1124             Treatment Charges: Mins Units   Ther Ex  57240     Manual Therapy 47470     Thera Activities 51342 14 1   ADL/Home Mgt 76472 10 1   Neuro Re-ed 21806     Group Therapy      Orthotic manage/training  30447     Non-Billable Time     Total Timed Treatment 24 2       Tita MOLINA/JESUS [id number]

## 2025-01-23 VITALS
DIASTOLIC BLOOD PRESSURE: 61 MMHG | HEIGHT: 72 IN | RESPIRATION RATE: 16 BRPM | WEIGHT: 268.96 LBS | TEMPERATURE: 97.5 F | OXYGEN SATURATION: 96 % | SYSTOLIC BLOOD PRESSURE: 111 MMHG | BODY MASS INDEX: 36.43 KG/M2 | HEART RATE: 83 BPM

## 2025-01-23 LAB
TROPONIN I SERPL HS-MCNC: 12 NG/L (ref 0–11)
TROPONIN I SERPL HS-MCNC: 14 NG/L (ref 0–11)

## 2025-01-23 PROCEDURE — 2500000003 HC RX 250 WO HCPCS: Performed by: PSYCHIATRY & NEUROLOGY

## 2025-01-23 PROCEDURE — 6360000002 HC RX W HCPCS: Performed by: FAMILY MEDICINE

## 2025-01-23 PROCEDURE — 6370000000 HC RX 637 (ALT 250 FOR IP): Performed by: NURSE PRACTITIONER

## 2025-01-23 PROCEDURE — 6370000000 HC RX 637 (ALT 250 FOR IP): Performed by: FAMILY MEDICINE

## 2025-01-23 PROCEDURE — 84484 ASSAY OF TROPONIN QUANT: CPT

## 2025-01-23 PROCEDURE — 97535 SELF CARE MNGMENT TRAINING: CPT

## 2025-01-23 PROCEDURE — 6370000000 HC RX 637 (ALT 250 FOR IP): Performed by: PHYSICIAN ASSISTANT

## 2025-01-23 PROCEDURE — 36415 COLL VENOUS BLD VENIPUNCTURE: CPT

## 2025-01-23 PROCEDURE — 97530 THERAPEUTIC ACTIVITIES: CPT

## 2025-01-23 PROCEDURE — 2500000003 HC RX 250 WO HCPCS: Performed by: FAMILY MEDICINE

## 2025-01-23 RX ORDER — AMLODIPINE BESYLATE 10 MG/1
10 TABLET ORAL DAILY
Qty: 30 TABLET | Refills: 3 | Status: SHIPPED | OUTPATIENT
Start: 2025-01-24

## 2025-01-23 RX ORDER — PANTOPRAZOLE SODIUM 40 MG/1
40 TABLET, DELAYED RELEASE ORAL
Qty: 30 TABLET | Refills: 3 | Status: SHIPPED | OUTPATIENT
Start: 2025-01-23

## 2025-01-23 RX ORDER — ATORVASTATIN CALCIUM 40 MG/1
40 TABLET, FILM COATED ORAL NIGHTLY
Qty: 30 TABLET | Refills: 3 | Status: SHIPPED | OUTPATIENT
Start: 2025-01-23

## 2025-01-23 RX ORDER — CARVEDILOL 25 MG/1
25 TABLET ORAL 2 TIMES DAILY WITH MEALS
Qty: 60 TABLET | Refills: 3 | Status: SHIPPED | OUTPATIENT
Start: 2025-01-23

## 2025-01-23 RX ORDER — ASPIRIN 81 MG/1
81 TABLET, CHEWABLE ORAL DAILY
Qty: 30 TABLET | Refills: 3 | Status: SHIPPED | OUTPATIENT
Start: 2025-01-24

## 2025-01-23 RX ORDER — TAMSULOSIN HYDROCHLORIDE 0.4 MG/1
0.4 CAPSULE ORAL DAILY
Qty: 30 CAPSULE | Refills: 3 | Status: SHIPPED | OUTPATIENT
Start: 2025-01-24

## 2025-01-23 RX ADMIN — TAMSULOSIN HYDROCHLORIDE 0.4 MG: 0.4 CAPSULE ORAL at 08:12

## 2025-01-23 RX ADMIN — SODIUM CHLORIDE, PRESERVATIVE FREE 10 ML: 5 INJECTION INTRAVENOUS at 08:15

## 2025-01-23 RX ADMIN — CARVEDILOL 25 MG: 25 TABLET, FILM COATED ORAL at 08:12

## 2025-01-23 RX ADMIN — ENOXAPARIN SODIUM 30 MG: 100 INJECTION SUBCUTANEOUS at 08:13

## 2025-01-23 RX ADMIN — SODIUM CHLORIDE, PRESERVATIVE FREE 10 ML: 5 INJECTION INTRAVENOUS at 08:13

## 2025-01-23 RX ADMIN — AMLODIPINE BESYLATE 10 MG: 10 TABLET ORAL at 08:13

## 2025-01-23 RX ADMIN — PANTOPRAZOLE SODIUM 40 MG: 40 TABLET, DELAYED RELEASE ORAL at 05:58

## 2025-01-23 RX ADMIN — ASPIRIN 81 MG: 81 TABLET, CHEWABLE ORAL at 08:13

## 2025-01-23 NOTE — DISCHARGE SUMMARY
[facility]       Discharge Summary     Patient ID: Tremayne Han  :  1975   MRN: [id number]     ACCOUNT:  [id number]   Patient's PCP: Roverto Santiago MD  Admit Date: 2025   Discharge Date: 2025     Length of Stay: 5  Code Status:  Full Code  Admitting Physician: Robi Marte MD  Discharge Physician: Leanna He MD     Active Discharge Diagnoses:     Hospital Problem Lists:  Principal Problem:    Chest pain  Active Problems:    Primary hypertension    Type 2 diabetes mellitus without complication, without long-term current use of insulin (HCC)    Pure hypercholesterolemia    Acute urinary retention    Moderate obesity    Acute CVA (cerebrovascular accident) (HCC)    Acute left hemiparesis (HCC)    Hypertensive urgency    Stroke-like symptoms  Resolved Problems:    * No resolved hospital problems. *      Admission Condition:  fair     Discharged Condition: fair    Hospital Stay:     Hospital Course:  Tremayne Han is a 49 y.o. male who was admitted for the management of   Chest pain , presented to ER with Chest Pain ((Started while laying in bed about hour ago, mid sternal described as sharp and shooting, comes and goes) ZOFRAN, NITRO, ASA given PTA by EMS.)    49 y.o. male who presented to [facility] with chest pain and was found to have significant elevated blood pressure of 233/132.  Patient received couple of IV labetalol and hydralazine.  Initial plan was to start him on nicardipine drip but blood pressure improved to 166/122.  Patient was complaining of chest pain mid chest region on his left side radiating to his left arm.  He does mention to have a history of high blood pressure and takes 2 medications but does not recall what medications he is given in retirement.  CT angiogram of the chest was negative for PE.  Troponin of 9.  Patient had urinary retention and required straight cath x 2 in the ER.  Bladder scan with about 400 cc post

## 2025-01-23 NOTE — PLAN OF CARE
Problem: Discharge Planning  Goal: Discharge to home or other facility with appropriate resources  1/23/2025 0524 by Lea Baltazar RN  Outcome: Progressing  1/22/2025 1609 by William Russo RN  Outcome: Progressing     Problem: Chronic Conditions and Co-morbidities  Goal: Patient's chronic conditions and co-morbidity symptoms are monitored and maintained or improved  1/23/2025 0524 by Lea Baltazar RN  Outcome: Progressing  1/22/2025 1609 by William Russo RN  Outcome: Progressing     Problem: ABCDS Injury Assessment  Goal: Absence of physical injury  1/23/2025 0524 by Lea Baltazar RN  Outcome: Progressing  1/22/2025 1609 by William Russo RN  Outcome: Progressing     Problem: Safety - Adult  Goal: Free from fall injury  1/23/2025 0524 by Lea Baltazar RN  Outcome: Progressing  1/22/2025 1609 by William Russo RN  Outcome: Progressing     Problem: Neurosensory - Adult  Goal: Achieves stable or improved neurological status  1/23/2025 0524 by Lea Baltazar RN  Outcome: Progressing  1/22/2025 1609 by William Russo RN  Outcome: Progressing  Goal: Absence of seizures  1/23/2025 0524 by Lea Baltazar RN  Outcome: Progressing  1/22/2025 1609 by William Russo RN  Outcome: Progressing  Goal: Achieves maximal functionality and self care  1/23/2025 0524 by Lea Baltazar RN  Outcome: Progressing  1/22/2025 1609 by William Russo RN  Outcome: Progressing     Problem: Cardiovascular - Adult  Goal: Maintains optimal cardiac output and hemodynamic stability  1/23/2025 0524 by Lea Baltazar RN  Outcome: Progressing  1/22/2025 1609 by William Russo RN  Outcome: Progressing  Goal: Absence of cardiac dysrhythmias or at baseline  1/23/2025 0524 by Lea Baltazar RN  Outcome: Progressing  1/22/2025 1609 by William Russo RN  Outcome: Progressing     Problem: Genitourinary - Adult  Goal: Absence of urinary retention  1/23/2025 0524 by Lea Baltazar RN  Outcome: Progressing  1/22/2025 1609 by William Russo, RN  Outcome:  Progressing  Goal: Urinary catheter remains patent  1/23/2025 0524 by Lea Baltazar RN  Outcome: Progressing  1/22/2025 1609 by William Russo RN  Outcome: Progressing     Problem: Skin/Tissue Integrity  Goal: Absence of new skin breakdown  Description: 1.  Monitor for areas of redness and/or skin breakdown  2.  Assess vascular access sites hourly  3.  Every 4-6 hours minimum:  Change oxygen saturation probe site  4.  Every 4-6 hours:  If on nasal continuous positive airway pressure, respiratory therapy assess nares and determine need for appliance change or resting period.  1/23/2025 0524 by Lea Baltazar RN  Outcome: Progressing  1/22/2025 1609 by William Russo RN  Outcome: Progressing     Problem: Pain  Goal: Verbalizes/displays adequate comfort level or baseline comfort level  1/23/2025 0524 by Lea Baltazar RN  Outcome: Progressing  1/22/2025 1609 by William Russo RN  Outcome: Progressing

## 2025-01-23 NOTE — PROGRESS NOTES
OCCUPATIONAL THERAPY TREATMENT NOTE  JOSE RAMON [facility] [address] [place], [place]      Date:2025  Patient Name: Tremayne Han  MRN: [id number]  : 1975  Room: [facility]74-A     Per OT Eval:   Evaluating OT: Aisha Rincon, OTR/L [id number]     Referring Provider:   Fabiola Ray APRN - CNS       Specific Provider Orders/Date: OT eval and treat (25)        Diagnosis: Chest pain [R07.9]  Chest pain, unspecified type [R07.9]          Reason for admission:  49 y.o. male history of hypertension history of hyperlipidemia presenting to the ED for chest pain, while in the process of cardiac workup, an RRT was called for stroke alert: L sided weakness and vision changes. NIHSS 11         Surgery/Procedures:  TNK           Pertinent Medical History:    Past Medical History   History reviewed. No pertinent past medical history.      *Precautions:  Fall Risk, alarms, prisoner, officers, shackles, hypotension     Assessment of current deficits   [x] Functional mobility          [x]ADLs           [x] Strength                  []Cognition   [x] Functional transfers        [x] IADLs         [x] Safety Awareness   [x]Endurance   [x] Fine Coordination           [x] ROM           [] Vision/perception    []Sensation     [x]Gross Motor Coordination [x] Balance    [] Delirium                  []Motor Control     [] Communication     OT PLAN OF CARE   OT POC based on physician orders, patient diagnosis and results of clinical assessment.        Frequency/Duration: 1-3 days/wk for 1-2 weeks PRN    Specific OT Treatment to include:   ADL retraining/adapted techniques and AE recommendations to increase functional independence within precautions                    Energy conservation techniques to improve tolerance for selfcare routine   Functional transfer/mobility training/DME recommendations for increased independence, safety and fall prevention

## 2025-01-23 NOTE — DISCHARGE INSTR - COC
Continuity of Care Form    Patient Name: Tremayne Han   :  1975  MRN:  [id number]    Admit date:  2025  Discharge date:  ***    Code Status Order: Full Code   Advance Directives:   Advance Care Flowsheet Documentation             Admitting Physician:  Robi Marte MD  PCP: Roverto Santiago MD    Discharging Nurse: ***  Discharging Hospital Unit/Room#: 7418/7418-A  Discharging Unit Phone Number: ***    Emergency Contact:   No emergency contact information on file.    Past Surgical History:  History reviewed. No pertinent surgical history.    Immunization History:     There is no immunization history on file for this patient.    Active Problems:  Patient Active Problem List   Diagnosis Code    Chest pain R07.9    Primary hypertension I10    Type 2 diabetes mellitus without complication, without long-term current use of insulin ([facility]) E11.9    Pure hypercholesterolemia E78.00    Acute urinary retention R33.8    Moderate obesity E66.9    Acute CVA (cerebrovascular accident) ([facility]) I63.9    Acute left hemiparesis ([facility]) G81.94    Hypertensive urgency I16.0    Stroke-like symptoms R29.90       Isolation/Infection:   Isolation            No Isolation          Patient Infection Status       None to display            Nurse Assessment:  Last Vital Signs: /67   Pulse 84   Temp 97.7 °F (36.5 °C) (Temporal)   Resp 18   Ht 1.829 m (6')   Wt 122 kg (268 lb 15.4 oz)   SpO2 94%   BMI 36.48 kg/m²     Last documented pain score (0-10 scale): Pain Level: 6  Last Weight:   Wt Readings from Last 1 Encounters:   25 122 kg (268 lb 15.4 oz)     Mental Status:  {IP PT MENTAL STATUS:56186}    IV Access:  { ASH IV ACCESS:802841528}    Nursing Mobility/ADLs:  Walking   {CHP DME ADLs:970589743}  Transfer  {CHP DME ADLs:094304783}  Bathing  {CHP DME ADLs:61973}  Dressing  {CHP DME ADLs:338855087}  Toileting  {CHP DME ADLs:485380156}  Feeding  {CHP DME ADLs:859404859}  Med Admin  {CHP DME

## 2025-01-23 NOTE — PROGRESS NOTES
All applicable monitoring and IV's removed from patient. DC paperwork provided and explained to patient, with questions answered. Pt assisted to wheelchair and left with guards.

## 2025-01-25 NOTE — PROGRESS NOTES
Physician Progress Note      PATIENT:               JUHI HERNANDEZ  CSN #:                  [id number]  :                       1975  ADMIT DATE:       2025 11:43 PM  DISCH DATE:        2025 12:04 PM  RESPONDING  PROVIDER #:        Leanna He MD          QUERY TEXT:    Patient admitted with AMS. Noted documentation of acute CVA in  neurology   consult.   neurology PN \"MRI brain is negative for acute stroke. Given   negative brain MRI and still with significant L sided symptoms with some   nonphysiologic findings on exam symptoms are felt to be most consistent with   functional neurologic disorder at this time.\" Plavix and Keppra discontinued.  In order to support the diagnosis of Acute CVA, please include additional   clinical indicators in your documentation.  Or please document if the   diagnosis of acute CVA has been ruled out after further study.    The medical record reflects the following:  Risk Factors: HTN, DM, HLD  Clinical Indicators: Neurology consult \"Acute CVA. Suspected acute focal   seizure type activity in his stroke distribution.\"   neurology PN \"MRI brain is negative for acute stroke. Given negative   brain MRI and still with significant L sided symptoms with some nonphysiologic   findings on exam symptoms are felt to be most consistent with functional   neurologic disorder at this time. Discontinue Plavix. Discontinue Keppra.\"  Treatment: MRI brain, dc plavix and Keppra  Options provided:  -- Acute CVA was ruled out  -- Acute CVA present as evidenced by, Please document evidence.  -- Other - I will add my own diagnosis  -- Disagree - Not applicable / Not valid  -- Disagree - Clinically unable to determine / Unknown  -- Refer to Clinical Documentation Reviewer    PROVIDER RESPONSE TEXT:    Acute CVA was ruled out after study.    Query created by: Bonnie Fernandes on 2025 11:19 AM      Electronically signed by:  Leanna He MD 2025 9:04 AM

## 2025-02-06 LAB
EKG ATRIAL RATE: 91 BPM
EKG P AXIS: 27 DEGREES
EKG P-R INTERVAL: 186 MS
EKG Q-T INTERVAL: 382 MS
EKG QRS DURATION: 94 MS
EKG QTC CALCULATION (BAZETT): 469 MS
EKG R AXIS: 43 DEGREES
EKG T AXIS: 24 DEGREES
EKG VENTRICULAR RATE: 91 BPM

## 2025-02-14 ENCOUNTER — APPOINTMENT (OUTPATIENT)
Dept: UROLOGY | Facility: CLINIC | Age: 50
End: 2025-02-14

## 2025-02-14 VITALS — HEIGHT: 72 IN | DIASTOLIC BLOOD PRESSURE: 83 MMHG | HEART RATE: 79 BPM | SYSTOLIC BLOOD PRESSURE: 136 MMHG

## 2025-02-14 DIAGNOSIS — N31.9 NEUROGENIC BLADDER: ICD-10-CM

## 2025-02-14 DIAGNOSIS — N40.1 BENIGN PROSTATIC HYPERPLASIA WITH LOWER URINARY TRACT SYMPTOMS, SYMPTOM DETAILS UNSPECIFIED: Primary | ICD-10-CM

## 2025-02-14 DIAGNOSIS — R33.9 URINARY RETENTION: ICD-10-CM

## 2025-02-14 RX ORDER — CARVEDILOL 25 MG/1
1 TABLET ORAL
COMMUNITY
Start: 2025-01-23

## 2025-02-14 RX ORDER — PERPHENAZINE 16 MG/1
16 TABLET ORAL DAILY
COMMUNITY

## 2025-02-14 RX ORDER — GLIPIZIDE 5 MG/1
5 TABLET ORAL
COMMUNITY

## 2025-02-14 RX ORDER — TAMSULOSIN HYDROCHLORIDE 0.4 MG/1
0.4 CAPSULE ORAL DAILY
Qty: 30 CAPSULE | Refills: 0 | Status: SHIPPED | OUTPATIENT
Start: 2025-02-14 | End: 2025-02-14

## 2025-02-14 RX ORDER — TAMSULOSIN HYDROCHLORIDE 0.4 MG/1
0.8 CAPSULE ORAL DAILY
Qty: 60 CAPSULE | Refills: 0 | Status: SHIPPED | OUTPATIENT
Start: 2025-02-14 | End: 2025-03-16

## 2025-02-14 RX ORDER — TAMSULOSIN HYDROCHLORIDE 0.4 MG/1
1 CAPSULE ORAL DAILY
COMMUNITY
Start: 2025-01-24 | End: 2025-02-14

## 2025-02-14 RX ORDER — NAPROXEN SODIUM 220 MG/1
1 TABLET, FILM COATED ORAL DAILY
COMMUNITY
Start: 2025-01-24

## 2025-02-14 RX ORDER — ATORVASTATIN CALCIUM 40 MG/1
1 TABLET, FILM COATED ORAL NIGHTLY
COMMUNITY
Start: 2025-01-23

## 2025-02-14 RX ORDER — FLUOXETINE HYDROCHLORIDE 20 MG/1
20 CAPSULE ORAL DAILY
COMMUNITY

## 2025-02-14 RX ORDER — PANTOPRAZOLE SODIUM 40 MG/1
40 TABLET, DELAYED RELEASE ORAL
COMMUNITY
Start: 2025-01-23

## 2025-02-14 RX ORDER — BENZTROPINE MESYLATE 1 MG/1
TABLET ORAL 2 TIMES DAILY
COMMUNITY

## 2025-02-14 NOTE — PROGRESS NOTES
02/14/2025  49-year-old gentleman presents acute urinary retention, had a stroke 5 weeks ago.  Complaining slow stream, some suprapubic pressure.    We discussed urinary retention, neurogenic bladder versus benign prostate hypertrophy  We discussed Hare catheter indwelling versus Flomax trial, all Mathew  All the questions were answered, the patient expressed understanding and agreed to the plan.    Impression  Urinary retention  Neurogenic bladder  BPH    Plan  Timed voiding every 2 hours  Flomax 0.4 mg daily 30-day trial  Change to Flomax 0.8 mg daily if already on 0.4 mg daily  Appointment in 1 week for PVR  Hare catheter if needed  ER if unable to void    Chief Complaint   Patient presents with    hesitency     Patient is here today for new patient visit for urinary hesitency, he is currently an imate at the [facility].  He recently was in the hospital for a stroke, he had a catheter in place at that time, but they took it out when the discharged him.          Physical Exam     TODAYS LAB RESULTS:  Pt unable to void GWM=221      ASSESSMENT&PLAN:      IMPRESSIONS:

## 2025-02-14 NOTE — PATIENT INSTRUCTIONS
Impression  Urinary retention  Neurogenic bladder  BPH    Plan  Timed voiding every 2 hours  Flomax 0.4 mg daily 30-day trial  Change to Flomax 0.8 mg daily if already on 0.4 mg daily  Appointment in 1 week for PVR  Hare catheter if needed  ER if unable to void

## 2025-02-28 ENCOUNTER — APPOINTMENT (OUTPATIENT)
Dept: UROLOGY | Facility: CLINIC | Age: 50
End: 2025-02-28
Payer: MEDICAID

## 2025-02-28 VITALS — DIASTOLIC BLOOD PRESSURE: 88 MMHG | SYSTOLIC BLOOD PRESSURE: 147 MMHG | HEART RATE: 61 BPM | HEIGHT: 72 IN

## 2025-02-28 DIAGNOSIS — N31.9 NEUROGENIC BLADDER: ICD-10-CM

## 2025-02-28 DIAGNOSIS — R33.9 URINARY RETENTION: ICD-10-CM

## 2025-02-28 DIAGNOSIS — N40.1 BENIGN PROSTATIC HYPERPLASIA WITH LOWER URINARY TRACT SYMPTOMS, SYMPTOM DETAILS UNSPECIFIED: ICD-10-CM

## 2025-02-28 PROCEDURE — 99214 OFFICE O/P EST MOD 30 MIN: CPT | Performed by: UROLOGY

## 2025-02-28 RX ORDER — FINASTERIDE 5 MG/1
5 TABLET, FILM COATED ORAL DAILY
Qty: 90 TABLET | Refills: 1 | Status: SHIPPED | OUTPATIENT
Start: 2025-02-28 | End: 2026-02-28

## 2025-02-28 RX ORDER — TAMSULOSIN HYDROCHLORIDE 0.4 MG/1
CAPSULE ORAL
Qty: 180 CAPSULE | Refills: 1 | Status: SHIPPED | OUTPATIENT
Start: 2025-02-28

## 2025-02-28 NOTE — PATIENT INSTRUCTIONS
Impression  Incomplete emptying  History urinary retention  Neurogenic bladder  BPH     Plan  Timed voiding every 2 hours  Continue Flomax 0.8 mg daily   Add Proscar 5 mg daily  Appointment in 6-month

## 2025-02-28 NOTE — PROGRESS NOTES
02/28/2025  Voiding okay, on Flomax 0.8 mg daily     ml    We discussed benign prostate hypertrophy, status post Cerva stroke, neurogenic bladder, incomplete emptying  We discussed to continue Flomax 0.8 mg daily, add Proscar 5 mg daily trial  All the questions were answered, the patient expressed understanding and agreed to the plan.    Impression  Incomplete emptying  History urinary retention  Neurogenic bladder  BPH     Plan  Timed voiding every 2 hours  Continue Flomax 0.8 mg daily   Add Proscar 5 mg daily  Appointment in 6-month  Hare catheter if needed    Chief Complaint   Patient presents with    Urinary Retention     Pt is here today for a 1 week follow up for urinary retention, a neurogenic bladder and BPH.         Physical Exam     TODAYS LAB RESULTS:  [id number]    ASSESSMENT&PLAN:      IMPRESSIONS:         02/14/2025  49-year-old gentleman presents acute urinary retention, had a stroke 5 weeks ago.  Complaining slow stream, some suprapubic pressure.     We discussed urinary retention, neurogenic bladder versus benign prostate hypertrophy  We discussed Hare catheter indwelling versus Flomax trial, all Mathew  All the questions were answered, the patient expressed understanding and agreed to the plan.     Impression  Urinary retention  Neurogenic bladder  BPH     Plan  Timed voiding every 2 hours  Flomax 0.4 mg daily 30-day trial  Change to Flomax 0.8 mg daily if already on 0.4 mg daily  Appointment in 1 week for PVR  Hare catheter if needed  ER if unable to void